# Patient Record
Sex: FEMALE | Race: ASIAN
[De-identification: names, ages, dates, MRNs, and addresses within clinical notes are randomized per-mention and may not be internally consistent; named-entity substitution may affect disease eponyms.]

---

## 2018-04-30 ENCOUNTER — HOSPITAL ENCOUNTER (INPATIENT)
Dept: HOSPITAL 88 - ER | Age: 83
LOS: 7 days | Discharge: NURSING, REHAB OR LONG-TERM CARE | DRG: 467 | End: 2018-05-07
Attending: SPECIALIST | Admitting: SPECIALIST
Payer: MEDICARE

## 2018-04-30 VITALS — BODY MASS INDEX: 23.51 KG/M2 | WEIGHT: 112 LBS | HEIGHT: 58 IN

## 2018-04-30 DIAGNOSIS — D64.9: ICD-10-CM

## 2018-04-30 DIAGNOSIS — I10: ICD-10-CM

## 2018-04-30 DIAGNOSIS — T84.52XA: Primary | ICD-10-CM

## 2018-04-30 DIAGNOSIS — B95.2: ICD-10-CM

## 2018-04-30 DIAGNOSIS — B95.62: ICD-10-CM

## 2018-04-30 DIAGNOSIS — Z96.642: ICD-10-CM

## 2018-04-30 DIAGNOSIS — N39.0: ICD-10-CM

## 2018-04-30 DIAGNOSIS — M16.12: ICD-10-CM

## 2018-04-30 LAB
ALBUMIN SERPL-MCNC: 3.1 G/DL (ref 3.5–5)
ALBUMIN/GLOB SERPL: 0.5 {RATIO} (ref 0.8–2)
ALP SERPL-CCNC: 83 IU/L (ref 40–150)
ALT SERPL-CCNC: 8 IU/L (ref 0–55)
ANION GAP SERPL CALC-SCNC: 13.8 MMOL/L (ref 8–16)
BACTERIA URNS QL MICRO: (no result) /HPF
BASOPHILS # BLD AUTO: 0 10*3/UL (ref 0–0.1)
BASOPHILS NFR BLD AUTO: 0.5 % (ref 0–1)
BILIRUB UR QL: NEGATIVE
BUN SERPL-MCNC: 23 MG/DL (ref 7–26)
BUN/CREAT SERPL: 24 (ref 6–25)
CALCIUM SERPL-MCNC: 9.7 MG/DL (ref 8.4–10.2)
CHLORIDE SERPL-SCNC: 104 MMOL/L (ref 98–107)
CK MB SERPL-MCNC: 1.2 NG/ML (ref 0–5)
CK SERPL-CCNC: 47 IU/L (ref 29–168)
CLARITY UR: CLEAR
CO2 SERPL-SCNC: 28 MMOL/L (ref 22–29)
COLOR UR: YELLOW
DEPRECATED APTT PLAS QN: 41.4 SECONDS (ref 23.8–35.5)
DEPRECATED INR PLAS: 1.16
DEPRECATED NEUTROPHILS # BLD AUTO: 3.7 10*3/UL (ref 2.1–6.9)
DEPRECATED RBC URNS MANUAL-ACNC: (no result) /HPF (ref 0–5)
EGFRCR SERPLBLD CKD-EPI 2021: 54 ML/MIN (ref 60–?)
EOSINOPHIL # BLD AUTO: 0.2 10*3/UL (ref 0–0.4)
EOSINOPHIL NFR BLD AUTO: 3.2 % (ref 0–6)
EPI CELLS URNS QL MICRO: (no result) /LPF
ERYTHROCYTE [DISTWIDTH] IN CORD BLOOD: 12.7 % (ref 11.7–14.4)
GLOBULIN PLAS-MCNC: 6.6 G/DL (ref 2.3–3.5)
GLUCOSE SERPLBLD-MCNC: 107 MG/DL (ref 74–118)
HCT VFR BLD AUTO: 32.9 % (ref 34.2–44.1)
HGB BLD-MCNC: 10.5 G/DL (ref 12–16)
KETONES UR QL STRIP.AUTO: NEGATIVE
LEUKOCYTE ESTERASE UR QL STRIP.AUTO: NEGATIVE
LYMPHOCYTES # BLD: 1.6 10*3/UL (ref 1–3.2)
LYMPHOCYTES NFR BLD AUTO: 26.7 % (ref 18–39.1)
MCH RBC QN AUTO: 28.5 PG (ref 28–32)
MCHC RBC AUTO-ENTMCNC: 31.9 G/DL (ref 31–35)
MCV RBC AUTO: 89.2 FL (ref 81–99)
MONOCYTES # BLD AUTO: 0.4 10*3/UL (ref 0.2–0.8)
MONOCYTES NFR BLD AUTO: 7 % (ref 4.4–11.3)
NEUTS SEG NFR BLD AUTO: 62.3 % (ref 38.7–80)
NITRITE UR QL STRIP.AUTO: NEGATIVE
PLATELET # BLD AUTO: 210 X10E3/UL (ref 140–360)
POTASSIUM SERPL-SCNC: 3.8 MMOL/L (ref 3.5–5.1)
PROT UR QL STRIP.AUTO: NEGATIVE
PROTHROMBIN TIME: 13.9 SECONDS (ref 11.9–14.5)
RBC # BLD AUTO: 3.69 X10E6/UL (ref 3.6–5.1)
SODIUM SERPL-SCNC: 142 MMOL/L (ref 136–145)
SP GR UR STRIP: 1.01 (ref 1.01–1.02)
UROBILINOGEN UR STRIP-MCNC: 0.2 MG/DL (ref 0.2–1)

## 2018-04-30 PROCEDURE — 86920 COMPATIBILITY TEST SPIN: CPT

## 2018-04-30 PROCEDURE — 82550 ASSAY OF CK (CPK): CPT

## 2018-04-30 PROCEDURE — 85018 HEMOGLOBIN: CPT

## 2018-04-30 PROCEDURE — 87205 SMEAR GRAM STAIN: CPT

## 2018-04-30 PROCEDURE — 99285 EMERGENCY DEPT VISIT HI MDM: CPT

## 2018-04-30 PROCEDURE — 36569 INSJ PICC 5 YR+ W/O IMAGING: CPT

## 2018-04-30 PROCEDURE — 85730 THROMBOPLASTIN TIME PARTIAL: CPT

## 2018-04-30 PROCEDURE — 93005 ELECTROCARDIOGRAM TRACING: CPT

## 2018-04-30 PROCEDURE — 80053 COMPREHEN METABOLIC PANEL: CPT

## 2018-04-30 PROCEDURE — 86900 BLOOD TYPING SEROLOGIC ABO: CPT

## 2018-04-30 PROCEDURE — 86850 RBC ANTIBODY SCREEN: CPT

## 2018-04-30 PROCEDURE — 97139 UNLISTED THERAPEUTIC PX: CPT

## 2018-04-30 PROCEDURE — 87186 SC STD MICRODIL/AGAR DIL: CPT

## 2018-04-30 PROCEDURE — 96361 HYDRATE IV INFUSION ADD-ON: CPT

## 2018-04-30 PROCEDURE — 71045 X-RAY EXAM CHEST 1 VIEW: CPT

## 2018-04-30 PROCEDURE — 84484 ASSAY OF TROPONIN QUANT: CPT

## 2018-04-30 PROCEDURE — 87086 URINE CULTURE/COLONY COUNT: CPT

## 2018-04-30 PROCEDURE — 82553 CREATINE MB FRACTION: CPT

## 2018-04-30 PROCEDURE — 82948 REAGENT STRIP/BLOOD GLUCOSE: CPT

## 2018-04-30 PROCEDURE — 87071 CULTURE AEROBIC QUANT OTHER: CPT

## 2018-04-30 PROCEDURE — 87075 CULTR BACTERIA EXCEPT BLOOD: CPT

## 2018-04-30 PROCEDURE — 80202 ASSAY OF VANCOMYCIN: CPT

## 2018-04-30 PROCEDURE — 81001 URINALYSIS AUTO W/SCOPE: CPT

## 2018-04-30 PROCEDURE — 83735 ASSAY OF MAGNESIUM: CPT

## 2018-04-30 PROCEDURE — 87040 BLOOD CULTURE FOR BACTERIA: CPT

## 2018-04-30 PROCEDURE — 72170 X-RAY EXAM OF PELVIS: CPT

## 2018-04-30 PROCEDURE — 36415 COLL VENOUS BLD VENIPUNCTURE: CPT

## 2018-04-30 PROCEDURE — 96367 TX/PROPH/DG ADDL SEQ IV INF: CPT

## 2018-04-30 PROCEDURE — 85610 PROTHROMBIN TIME: CPT

## 2018-04-30 PROCEDURE — 85014 HEMATOCRIT: CPT

## 2018-04-30 PROCEDURE — 96376 TX/PRO/DX INJ SAME DRUG ADON: CPT

## 2018-04-30 PROCEDURE — 85025 COMPLETE CBC W/AUTO DIFF WBC: CPT

## 2018-04-30 NOTE — DIAGNOSTIC IMAGING REPORT
PROCEDURE:

A single AP view of the chest.

 

COMPARISON: None.

 

INDICATIONS:   LEFT HIP INFECTION 

     

FINDINGS:

Lines/tubes:  None.

 

Lungs:  Scalloping of the right hemidiaphragm. There is no evidence of 

pneumonia or pulmonary edema. Mild biapical pleural-parenchymal 

scarring left greater than right.

 

Pleura:  There is no pleural effusion or pneumothorax.

 

Heart and mediastinum:  The cardiac silhouette is mildly enlarged. The 

thoracic aorta tortuous and unfolded with calcifications actually off 

the arch. Mild prominence of the pulmonary vasculature bilaterally may 

be related to portable technique.

 

Bones:  No acute bony abnormality.

 

IMPRESSION: 

 

1.  Cardiomegaly. Possible mild pulmonary venous congestion without 

pulmonary edema. 

 

 

HANNAH Page M.D.  

Dictated by:  HANNAH Page M.D. on 4/30/2018 at 17:56     

Electronically approved by:  HANNAH Page M.D. on 4/30/2018 at 

17:56

## 2018-04-30 NOTE — XMS REPORT
Clinical Summary

 Created on: 2018



Ginger Clinton

External Reference #: [id number]

: 1929

Sex: Female



Demographics







 Address  [address] [place]  [address]

 

 Home Phone  [phone]

 

 Preferred Language  English

 

 Marital Status  

 

 Sikhism Affiliation  Unknown

 

 Race  

 

 Ethnic Group  Non-





Author







 Author  [facility]

 

 Organization  [facility]

 

 Address  Unknown

 

 Phone  Unavailable







Support







 Name  Relationship  Address  Phone

 

 Sara Vega  ECON  Unknown  [phone]

 

 Danay Moon  [employer]  Unknown  [phone]







Care Team Providers







 Care Team Member Name  Role  Phone

 

 MELANIE Del Angel MD  PCP  [phone]







Allergies

No Known Allergies



Current Medications







      



  Prescription   Sig.   Disp.   Refills   Start   End Date   Status



      Date  

 

      



  oxybutynin (DITROPAN) 5   Take 5 mg by mouth 3       Active



  MG tablet   (three) times a day.     

 

      



  valsartan (DIOVAN) 160 MG   Take 160 mg by mouth       Active



  tablet   daily. Only take if     



   systolic is over 135     

 

      



  alendronate (FOSAMAX) 10   Take 10 mg by mouth daily       Active



  MG tablet   before breakfast. Take in     



   the morning with a full     



   glass of water on an     



   empty stomach, do NOT     



   take anything else by     



   mouth or lie down for the     



   next 30 min.     

 

      



  aspirin (ECOTRIN) 81 MG   Take 81 mg by mouth       Active



  enteric coated tablet   daily.     

 

      



  pantoprazole (PROTONIX)   Take 40 mg by mouth 2      12/15/20   Discontin



  40 MG EC tablet   (two) times a day.      17   ued

 

      



  acetaminophen (TYLENOL)   Take 325 mg by mouth as      20   Discontin



  325 MG tablet   needed for fever.      17   ued

 

      



  aspirin (ECOTRIN) 81 MG   Take 1 tablet (81 mg   30 tablet   3   20   



  enteric coated tablet   total) by mouth daily for     17   17 



   30 days.     

 

      



  clopidogrel (PLAVIX) 75   Take 1 tablet (75 mg   30 tablet   3   20   



  mg tablet   total) by mouth daily for     17   17 



   30 days.     

 

      



  cefdinir (OMNICEF) 300 MG   Take 1 capsule (300 mg   20 capsule   0   20   



  capsule   total) by mouth 2 (two)     17   17 



   times a day for 10 days.     

 

      



  OYSCO 500/D 500   TK 1 T PO BID WF    0   10/03/20   12/28/20   Discontin



  mg(1,250mg) -200 unit per      17   17   ued



  tablet      

 

      



  clopidogrel (PLAVIX) 75   Take 75 mg by mouth      20   Discontin



  mg tablet   daily.      18   ued

 

      



  HYDROcodone-acetaminophen   Take 1 tablet by mouth     20   




  (NORCO) 7.5-325 mg per   every 6 (six) hours as     18   18 



  tablet   needed for moderate pain     



   for up to 20 doses. Max     



   Daily Amount: 4 tablets     

 

      



  acetaminophen-codeine   Take 1 tablet by mouth   10 tablet   0   20   



  (TYLENOL WITH CODEINE #3)   every 8 (eight) hours as     18   18 



  300-30 mg per tablet   needed for moderate pain     



   for up to 10 doses.     







Active Problems







 



  Problem   Noted Date

 

 



  Acute cystitis with hematuria   2017







Encounters







    



  Date   Type   Specialty   Care Team   Description

 

    



  2018   Emergency   Emergency Medicine   Melina Castro,   Left 
hip pain (Primary



     PA-C   Dx)



     Mazin Last MD 

 

    



  2018   Hospital   Radiology   Romaine Jack MD   Calculus of kidney;



   Encounter     Calculus of ureter

 

    



  2018   Ancillary   Radiology   Romaine Jack MD   Calculus of kidney;



   Orders     Calculus of ureter

 

    



  2018   Hospital   General Surgery   Romaine Jack MD 



   Encounter   

 

    



  2018   Anesthesia   General Surgery   Maida Gonzalez MD 



   Event   

 

    



  2018   Procedure Pass   General Surgery  

 

    



  2018   Surgery   General Surgery   Romaine Jack MD   Extracorporeal 
Shockwave



      Lithotripsy (Eswl)



      INITIAL-RIGHT

 

    



  2017   Hospital   General Surgery   Romaine Jack MD 



   Encounter   

 

    



  2017   Procedure Pass   General Surgery  

 

    



  2017   Surgery   General Surgery   Romaine Jack MD   **Canceled**



      Extracorporeal Shockwave



      Lithotripsy (Eswl)



      INITIAL-RIGHT

 

    



  2017   Patient   Quality   Mimi Rinaldi RN 



   Outreach   

 

    



  2017   Anesthesia   General Surgery   Valencia Story MD 



   Event   

 

    



  2017   Procedure Pass   General Surgery  

 

    



  2017   Surgery   General Surgery   Romaine Jack MD   Cysto, Stent 
insertion

 

    



  2017   Procedure Pass   General Internal Medicine  

 

    



  2017   [facility]   General Internal Medicine   Mundo Gutierrez MD   
Acute cystitis with



  -   Encounter    Rakesh De MD   hematuria (Primary Dx);



  2017     Legih Rojas MD   Renal insufficiency;



      Non-ST elevated



      myocardial infarction

 

    



  2017   Transcribe   Access   Barber Montelongo   Hematuria 
syndrome



   Maye CHOE   (Primary Dx)



after 2017



Immunizations







  



  Name   Dates Previously Given   Next Due

 

  



  FLUCELVAX QUAD PF (0.5mL   2017 



  syringe)  







Family History







   



  Medical History   Relation   Name   Comments

 

   



  No Known Problems   Father  

 

   



  Stroke   Mother  









   



  Relation   Name   Status   Comments

 

   



  Father   

 

   



  Mother   







Social History







    



  Tobacco Use   Types   Packs/Day   Years Used   Date

 

    



  Never Smoker    

 

    



  Smokeless Tobacco: Never   



  Used   









   



  Alcohol Use   Drinks/Week   oz/Week   Comments

 

   



  No   









 



  Sex Assigned at Birth   Date Recorded

 

 



  Not on file 







Last Filed Vital Signs







  



  Vital Sign   Reading   Time Taken

 

  



  Blood Pressure   143/62   2018  3:15 PM CST

 

  



  Pulse   59   2018  3:15 PM CST

 

  



  Temperature   36.8   C (98.2   F)   2018 11:59 AM CST

 

  



  Respiratory Rate   14   2018  3:15 PM CST

 

  



  Oxygen Saturation   99%   2018  3:15 PM CST

 

  



  Inhaled Oxygen   -   -



  Concentration  

 

  



  Weight   49.9 kg (110 lb)   2018 11:56 AM CST

 

  



  Height   147.3 cm (4' 10")   2018 11:56 AM CST

 

  



  Body Mass Index   22.99   2018 11:56 AM CST







Plan of Treatment







   



  Health Maintenance   Due Date   Last Done   Comments

 

   



  SHINGRIX VACCINE (#1)   1979  

 

   



  ZOSTER VACCINE   1989  

 

   



  PNEUMOCOCCAL   1994  



  POLYSACCHARIDE VACCINE   



  AGE 65 AND OVER   

 

   



  PNEUMOCOCCAL-13   1994  

 

   



  INFLUENZA VACCINE   2018 







Implants







      



  Implanted   Type   Area      Device   Expiration   Model /



      Identifier   Date   Serial /



        Lot

 

      



  Stent Uretl In[place] 6fr 22cm   Surgical   Right:   [employer]    2020   [id number] /



  W/ Ziggy Gonzalez Ltxf - [id number]   Stents   Ureter,   DIVISION     /



  Implanted: Qty: 1 on 2017 by    Native      [id number]



  Romaine Jack MD      







Procedures







    



  Procedure Name   Priority   Date/Time   Associated Diagnosis   Comments

 

    



  ID AN ELECTIVE   Routine   2018  



  SUPRAGLOTTIC AIRWAY    1:02 PM CST  

 

  



   Procedure



   Note -



   Gianfranco Zuleta CRNA -



   2018



   1:02 PM



   CST



   Airway



   Date/Time:



   1/3/2018



   12:58 PM



   Performed



   by:



   GIANFRANCO ZULETA



   Authorized



   by: MAIDA GONZALEZ





   Location:



   OR



   Urgency:



   Elective



   Difficult



   Airway: No



   Resident/C



   RNA/AA:



   GIANFRANCO ZULETA



   Performed



   by:



   resident/C



   RNA/AA



   Preoxygena



   guzman with



   100% O2:



   Yes



   C-spine



   Precaution



   s



   Maintained



   Throughout



   : Yes



   Mask



   Ventilatio



   n:  Not



   attempted



   Final



   Airway



   Type:



   Supraglott



   ic airway



   Final LMA:



   Classic



   LMA Size:



   3



   Number of



   Attempts



   at



   Approach:



   1



 

    



  ID AN ELECTIVE   Routine   2017  



  SUPRAGLOTTIC AIRWAY    11:52 AM CST  

 

  



   Procedure



   Note -



   Valencia Story MD -



   2017



   11:52 AM



   CST



   Airway



   Date/Time:



   2017



   11:15 AM



   Performed



   by:



   VALENCIA STORY



   Authorized



   by:



   VALENCIA STORY





   Location:



   OR



   Urgency:



   Elective



   Difficult



   Airway: No



   Preoxygena



   guzman with



   100% O2:



   Yes



   C-spine



   Precaution



   s



   Maintained



   Throughout



   : Yes



   Mask



   Ventilatio



   n:  Easy



   mask



   Final



   Airway



   Type:



   Supraglott



   ic airway



   Final LMA:



   Classic



   LMA Size:



   3



   Number of



   Attempts



   at



   Approach:



   1



 

    



  ECHOCARDIOGRAM 2D   Routine   2017    Results for this



  COMPLETE W MMODE SPECTRAL    4:53 PM CST    procedure are in the



  COLOR DOPPLER (47327)      results section.



after 2017



Results

* XR Hip 2-3 View Left (2018  1:35 PM)





 



  Specimen   Performing Laboratory

 

 



   [facility]



   [address] [place] [address]









 Narrative

 

 



EXAMINATION:XR HIP 2-3 VIEWS LEFT



 



CLINICAL HISTORY:hip pain



 



COMPARISON:None.



 



FINDINGS: Lungs are severely and diffusely osteopenic. The right hip 
demonstrates joint space narrowing and. On the left a left total hip 
arthroplasty is in place. A long stemmed femoral component is present with a 
cerclage wire although there appears to



 be a fracture with separation of the greater trochanter cranially and lesser 
trochanter medially these findings appear unchanged from a prior CT scan from 
2017 there may be more distraction of the fracture fragments.



 



 



IMPRESSION:Postop changes on the left with a left total hip arthroplasty. 
Separation of the greater trochanter superiorly lesser trochanter medially 
stable since previous.



 



Left hip: AP and frog-lateral views: No additional findings of significance are 
noted. There is no evidence of loosening of the prosthesis. The femoral 
component appears well-seated in the acetabular component. The fragmentation of 
the greater and lesser



 trochanters are again noted.



 



IMPRESSION: Postop changes as described with stable fragmentation of the 
greater and lesser trochanters as well as a fragment along lateral shaft the 
proximal diaphyseal region. This also appears chronic



 



 



[id number]



 









 Procedure Note

 

 



 Interface, Radiology Results Incoming - 2018  1:57 PM CST



EXAMINATION:  XR HIP 2-3 VIEWS LEFT



CLINICAL HISTORY:  hip pain



COMPARISON:  None.



FINDINGS: Lungs are severely and diffusely osteopenic. The right hip 
demonstrates joint space narrowing and. On the left a left total hip 
arthroplasty is in place. A long stemmed femoral component is present with a 
cerclage wire although there appears to

 be a fracture with separation of the greater trochanter cranially and lesser 
trochanter medially these findings appear unchanged from a prior CT scan from 
2017 there may be more distraction of the fracture fragments.





IMPRESSION:  Postop changes on the left with a left total hip arthroplasty. 
Separation of the greater trochanter superiorly lesser trochanter medially 
stable since previous.



Left hip: AP and frog-lateral views: No additional findings of significance are 
noted. There is no evidence of loosening of the prosthesis. The femoral 
component appears well-seated in the acetabular component. The fragmentation of 
the greater and lesser

 trochanters are again noted.



IMPRESSION: Postop changes as described with stable fragmentation of the 
greater and lesser trochanters as well as a fragment along lateral shaft the 
proximal diaphyseal region. This also appears chronic





[id number]







* PV duplex venous lower extremity (2018 12:45 PM)





 



  Specimen   Performing Laboratory

 

 



    [facility]



   [address] [place] [address]









 Narrative

 

 



 The left lower extremity was negative for deep vein thrombosis.



 





* XR Abdomen 1 Vw (2018 10:35 AM)





 



  Specimen   Performing Laboratory

 

 



    [facility]



   [address] [place] [address]









 Narrative

 

 



EXAMINATION:XR ABDOMEN 1 VW



 



CLINICAL HISTORY:N20.0 Calculus of kidney, N20.1 Calculus of ureter



 



COMPARISON:CT abdomen 2017



 



FINDINGS:



 



The bowel gas pattern is nonspecific. There is a double-J stent in the right 
ureter, in good position.



 No pathologic masses or calcifications are identified.



 The lung bases are free of acute disease. Regional skeletal structures are 
unremarkable.



 



IMPRESSION:



No plain film evidence of urinary tract calculus.



 



[facility]-[id number]



 









 Procedure Note

 

 



 Interface, Radiology Results Incoming - 2018  2:36 PM CST



EXAMINATION:  XR ABDOMEN 1 VW



CLINICAL HISTORY:  N20.0 Calculus of kidney, N20.1 Calculus of ureter



COMPARISON:  CT abdomen 2017



FINDINGS:



The bowel gas pattern is nonspecific. There is a double-J stent in the right 
ureter, in good position.

 No pathologic masses or calcifications are identified.

 The lung bases are free of acute disease. Regional skeletal structures are 
unremarkable.



IMPRESSION:

No plain film evidence of urinary tract calculus.



[facility]-[id number]







* XR Chest 1 Vw Portable (2017  9:11 AM)



Only the most recent of 3 results within the time period is included.





 



  Specimen   Performing Laboratory

 

 



   [address] [address]









 Narrative

 

 



EXAMINATION:XR CHEST 1 VW PORTABLE



 



CLINICAL HISTORY:Congestive Heart Failure



 



COMPARISON:Most Recent



 



IMPRESSION:



 



Heart and mediastinum are stable. Mild interstitial opacities without acute 
consolidations. Bones are osteopenic. Right shoulder arthrosis. 



 



[facility]-[id number]



 









 Procedure Note

 

 



 Interface, Radiology Results Incoming - 2017  9:15 AM CST



EXAMINATION:  XR CHEST 1 VW PORTABLE



CLINICAL HISTORY:  Congestive Heart Failure



COMPARISON:  Most Recent



IMPRESSION:



Heart and mediastinum are stable. Mild interstitial opacities without acute 
consolidations. Bones are osteopenic. Right shoulder arthrosis. 



[facility]-[id number]







* Estimated GFR (2017  7:07 AM)



Only the most recent of 7 results within the time period is included.





  



  Component   Value   Ref Range

 

  



  GFR Non Af Amer   33 (A)   mL/min/1.73 m2

 

  



  GFR Af Amer   40 (A)   mL/min/1.73 m2



   Comment: 



   Chronic kidney disease: <60 mL/min/1.73m2 



   Kidney failure: <15 mL/min/1.73m2 



   The estimated GFR is calculated from the 



   IDMS-traceable Modification of Diet 



   in Renal Disease Equation. The accuracy of the 



   calculation is poor when the 



   creatinine is normal. Calculated values >90 



   mL/min/1.73m2 are not reported. 



   This equation has not been validated in children 



   (<18 years), pregnant 



   women, the elderly (>70 years), or ethnic groups 



   other than Caucasians and 



    Americans. 









 



  Specimen   Performing Laboratory

 

 



  Plasma specimen   [facility] DEPARTMENT OF PATHOLOGY AND GENOMIC MEDICINE



   [address]



   [place] [address]





* CBC with platelet and differential (2017  7:07 AM)



Only the most recent of 8 results within the time period is included.





  



  Component   Value   Ref Range

 

  



  WBC   9.4   4.2 - 11.0 k/uL

 

  



  RBC   3.30 (L)   4.04 - 5.86 m/uL

 

  



  HGB   9.8 (L)   11.5 - 15.3 g/dL

 

  



  HCT   30.0 (L)   34.0 - 45.0 %

 

  



  MCV   90.9   80.0 - 98.0 fL

 

  



  MCH   29.7   27.0 - 34.0 pg

 

  



  MCHC   32.7   31.5 - 36.5 g/dL

 

  



  RDW - SD   47.7   37.0 - 51.0 fL

 

  



  MPV   10.6 (H)   7.4 - 10.4 fL

 

  



  Platelet count   194   150 - 400 k/uL

 

  



  Nucleated RBC   0.00   /100 WBC

 

  



  Neutrophils   74.1 (H)   36.0 - 66.0 %

 

  



  Lymphocytes   18.4 (L)   24.0 - 44.0 %

 

  



  Monocytes   5.6   0.0 - 6.0 %

 

  



  Eosinophils   1.0   0.0 - 6.0 %

 

  



  Basophils   0.1   0.0 - 1.2 %

 

  



  Immature granulocytes   0.8   0.0 - 1.0 %









 



  Specimen   Performing Laboratory

 

 



  Blood   [facility] DEPARTMENT OF PATHOLOGY AND [facility] [employer] [facility]



   [address]



   [place] [address]





* Phosphorus level (2017  7:07 AM)



Only the most recent of 4 results within the time period is included.





  



  Component   Value   Ref Range

 

  



  Phosphorus   3.5   2.5 - 4.5 mg/dL









 



  Specimen   Performing Laboratory

 

 



  Plasma specimen   [facility] DEPARTMENT OF PATHOLOGY AND GENOMIC MEDICINE



   [address]



   [place] [address]





* Magnesium level (2017  7:07 AM)



Only the most recent of 4 results within the time period is included.





  



  Component   Value   Ref Range

 

  



  Magnesium   2.10   1.60 - 2.40 mg/dL









 



  Specimen   Performing Laboratory

 

 



  Plasma specimen   [facility] DEPARTMENT OF PATHOLOGY AND GENOMIC MEDICINE



   [address] [place] [address] [address]



   [place] [address]





* Basic metabolic panel (2017  7:07 AM)



Only the most recent of 5 results within the time period is included.





  



  Component   Value   Ref Range

 

  



  Sodium   143   135 - 150 mEq/L

 

  



  Potassium   3.4 (L)   3.5 - 5.0 mEq/L

 

  



  Chloride   107   100 - 109 mEq/L

 

  



  CO2   29   24 - 32 mmol/L

 

  



  Anion gap   7   7 - 15 mEq/L



   Comment: 



   Starting from  , anion gap 



   calculation 



   no longer incorporates potassium. Please note the 



   change. 

 

  



  BUN   31 (H)   7 - 18 mg/dL

 

  



  Creatinine   1.5   0.8 - 1.5 mg/dL

 

  



  Glucose   87   65 - 100 mg/dL

 

  



  Calcium   7.7 (L)   8.6 - 10.7 mg/dL









 



  Specimen   Performing Laboratory

 

 



  Plasma specimen   [facility] DEPARTMENT OF PATHOLOGY AND GENOMIC MEDICINE



   [address]



   [place] [address]





* FL Pyelogram Retrograde (2017 11:48 AM)





 



  Specimen   Performing Laboratory

 

 



    [facility]



   [address] [place] [address]









 Narrative

 

 



EXAMINATION:FL PYELOGRAM RETROGRADE



 



CLINICAL HISTORY:Right urinary tract calculus



 



COMPARISON:None.



 



TECHNIQUE:



C-arm fluoroscopy was performed with 3 fluoroscopic spot images taken of the 
central abdomen and a portion of the upper pelvis upper pelvis in the anterior 
projection in a narrow field of view in the OR.



 



A total KV of79, and mA of 2.9, and fluoroscopic time of28 seconds was 
offered to  for the procedure.



 



The DAP (dose absorbed product) is: 4045 mGy-cm^2



 



A total of5 mL of Omnipaque 300 was injected by .



 



FINDINGS:



 



The  film demonstrates an oval opaque density projected to the right of 
the L3-L4 disc interspace level and represents the proximal right ureteric 
calculus.



 



On subsequent image the density presents as a filling defect with poor flow of 
contrast proximal to this site. Distal to this filling defect isn't opacified 
right ureter were which is unremarkable.



 



The final image demonstrate satisfactory placement of right upper urinary tract 
stent.



 



IMPRESSION:



 



Radiopaque calculus in the proximal right ureter.



 



Satisfactory placement of right upper urinary tract stent.



 



 [id number]



 









 Procedure Note

 

 



 Interface, Radiology Results Incoming - 2017  3:56 PM CST



EXAMINATION:  FL PYELOGRAM RETROGRADE



CLINICAL HISTORY:  Right urinary tract calculus    



COMPARISON:  None.



TECHNIQUE:

C-arm fluoroscopy was performed with 3 fluoroscopic spot images taken of the 
central abdomen and a portion of the upper pelvis upper pelvis in the anterior 
projection in a narrow field of view in the OR.



A total KV of  79, and mA of 2.9, and fluoroscopic time of  28 seconds was 
offered to Dr. Jack for the procedure.



The DAP (dose absorbed product) is: 4045 mGy-cm^2



A total of  5 mL of Omnipaque 300 was injected by Dr. Jack.



FINDINGS:



The  film demonstrates an oval opaque density projected to the right of 
the L3-L4 disc interspace level and represents the proximal right ureteric 
calculus.



On subsequent image the density presents as a filling defect with poor flow of 
contrast proximal to this site. Distal to this filling defect isn't opacified 
right ureter were which is unremarkable.



The final image demonstrate satisfactory placement of right upper urinary tract 
stent.



IMPRESSION:



Radiopaque calculus in the proximal right ureter.



Satisfactory placement of right upper urinary tract stent.



 [id number]







* Troponin (2017  7:23 AM)



Only the most recent of 6 results within the time period is included.





  



  Component   Value   Ref Range

 

  



  Troponin   0.07   0.00 - 0.60 ng/mL



   Comment: 



   0.11 - 1.49 ng/ml                May indicate 



   increased risk of acute 



   coronary 



   syndrome. 



   >=1.5 ng/ml                            Consistent 



   with acute myocardial 



   infarction. 



   The diagnostic value of a single normal or 



   non-diagnostic 



   result is questionable.    Serial samples at 2-6 



   hour intervals 



   are required to rule out acute myocardial 



   injury. 









 



  Specimen   Performing Laboratory

 

 



  Plasma specimen   [facility] DEPARTMENT OF PATHOLOGY AND GENOMIC MEDICINE



   [address]



   [place] [address]





* Manual differential (2017  7:23 AM)



Only the most recent of 2 results within the time period is included.





  



  Component   Value   Ref Range

 

  



  Manual differential   PERFORMED 

 

  



  Neutrophils   94.0 (H)   36.0 - 66.0 %

 

  



  Lymphocytes   4.0 (L)   24.0 - 44.0 %

 

  



  Monocytes   2.0   0.0 - 6.0 %

 

  



  Eosinophils   0.0   0.0 - 6.0 %

 

  



  Basophils   0.0   0.0 - 1.2 %

 

  



  Metamyelocytes   0   0 - 1 %

 

  



  Promyelocytes   0   0 - 1 %

 

  



  Platelet slide review   Karly adequate 

 

  



  Enlarged platelets   Few 









 



  Specimen   Performing Laboratory

 

 



   [facility] DEPARTMENT OF PATHOLOGY AND GENOMIC MEDICINE



   [address]



   [place] [address]





* Partial thromboplastin time, activated (2017  7:23 AM)



Only the most recent of 2 results within the time period is included.





  



  Component   Value   Ref Range

 

  



  PTT   205.5 (HH)   23.0 - 36.0 sec



   Comment: 



   PTT therapeutic range for unfractionated heparin 



   is 



   61.0-112.0 seconds which corresponds to Anti-Xa 



   0.3-0.7 U/ml. 



   Note:    Change in Panic Value 



   The PTT Panic Value is changing from 110 sec. to 



   100 sec. 



   due to new instrumentation and reagents. 



   Correlation studies have been performed to 



   validate this result. 



   Results called to and read back by SONIA KIRK at    08:14    2017 WEB 



   REPEAT HDO=993.7 









 



  Specimen   Performing Laboratory

 

 



  Blood   [facility] DEPARTMENT OF PATHOLOGY AND GENOMIC MEDICINE



   [address] [place] [address] [address]



   [place] [address]





* Prothrombin time with INR (2017  7:23 AM)



Only the most recent of 2 results within the time period is included.





  



  Component   Value   Ref Range

 

  



  Prothrombin time   15.7 (H)   12.0 - 15.0 sec

 

  



  INR   1.23 (H)   0.92 - 1.12



   Comment: 



   For patients on anticoagulant therapy, reference 



   ranges below: 



   Indication: 



   INR Value 



   Treatment of Venous 



   Thrombosis,                     2.0-3.0 



   pulmonary emboli, or prophylaxis 



   of a venous thrombosis, or systemic 



   emboli. 



   High dose, high risk 



   patients                         3.0-4.5 



   with mechanical valves. 



   NOTE:    INR values over 3.0 are sometimes 



   associated with 



   gastrointestinal hemorrhage, especially values 



   over 4.0. 









 



  Specimen   Performing Laboratory

 

 



  Blood   [facility] DEPARTMENT OF PATHOLOGY AND GENOMIC MEDICINE



   [address] [place] [address] [address]



   [place] [address]





* Anti Xa, unfractionated (2017  7:23 AM)



Only the most recent of 7 results within the time period is included.





  



  Component   Value   Ref Range

 

  



  Anti Xa, unfractionated   0.45Comment: Therapeutic Range:  0.30 - 0.70 U/mL   
0.30 - 0.70 U/mL









 



  Specimen   Performing Laboratory

 

 



  Blood   [facility] DEPARTMENT OF PATHOLOGY AND GENOMIC MEDICINE



   [address] [place] [address] [place]



   [place] [address]





* T3, free (2017  7:23 AM)





  



  Component   Value   Ref Range

 

  



  T3, free   0.68 (L)   2.18 - 3.98 pmol/L









 



  Specimen   Performing Laboratory

 

 



  Plasma specimen   [facility] DEPARTMENT OF PATHOLOGY AND GENOMIC MEDICINE



   [address] [place] [address]





* Thyroid stimulating hormone (2017  7:23 AM)





  



  Component   Value   Ref Range

 

  



  TSH   0.29 (L)   0.38 - 4.82 uIU/mL









 



  Specimen   Performing Laboratory

 

 



  Plasma specimen   [facility] DEPARTMENT OF PATHOLOGY AND [facility] MEDICINE



   [address] [place] [address]





* B natriuretic peptide (2017  7:23 AM)



Only the most recent of 2 results within the time period is included.





  



  Component   Value   Ref Range

 

  



  BNP   1,360 (H)   0 - 100 pg/mL









 



  Specimen   Performing Laboratory

 

 



  Blood   [facility] DEPARTMENT OF PATHOLOGY AND [facility] MEDICINE



   [address] [place] [address]





* Hemoglobin A1c (2017  7:23 AM)





  



  Component   Value   Ref Range

 

  



  Hemoglobin A1C   6.2 (H)   4.0 - 6.0 %



   Comment: 



   ************************************************** 



   *** 



   Less than 6% -                 Goal of therapy for 



   Type II Diabetes 



   Less than 7%-                    Goal of therapy 



   for Type I Diabetes 



   Less than 8%-                    Acceptable 



   control for Type I or Type 



   II 



   Diabetes 



   Greater than 8%-                Unacceptable 



   control; action indicated. 



   (A 



   DA94) 









 



  Specimen   Performing Laboratory

 

 



  Blood   [facility] DEPARTMENT OF PATHOLOGY AND [facility] MEDICINE



   [address] [place] [address]





* Ionized calcium (2017  7:23 AM)



Only the most recent of 2 results within the time period is included.





  



  Component   Value   Ref Range

 

  



  pH   7.37 

 

  



  Ionized calcium   1.06 (L)   1.11 - 1.32 mmol/L









 



  Specimen   Performing Laboratory

 

 



  Plasma specimen   [facility] DEPARTMENT OF PATHOLOGY AND GENOMIC MEDICINE



   [address] [place] [address]





* Lipid panel (2017  7:23 AM)





  



  Component   Value   Ref Range

 

  



  Cholesterol   113 (L)   120 - 200 mg/dL

 

  



  Triglycerides   131   50 - 150 mg/dL

 

  



  HDL cholesterol   16 (L)   40 - 60 mg/dL

 

  



  LDL cholesterol   82Comment: Result obtained by direct LDL   mg/dL



   measurement 

 

  



  Lipid panel   See below 



  interpretation   Comment: 



   Total Cholesterol (mg/dL) 



   LDL Cholesterol (mg/dL) 



   <200 



   Desirable                                <100 



   Optimal 



   200-239            Borderline-high 



   100-129            Near or above optimal 



   >=240                High 



   130-159            Borderline-high 



   160-189            High 



   >=190                Very high 



   HDL Cholesterol 



   (mg/dL)                             Triglycerides 



   (mg/dL) 



   <40                    Low 



   <150                 Normal 



   >=60 



   High 



   150-199            Borderline-high 



   200-499            High 



   >=500                Very high 



   Risk Catergories that modify LDL goals. 



   Risk 



   Catergories 



   LDL goal (mg/dL) 



   CHD and CHD risk 



   equivalent                        <100 



   (10-year risk >20%) 



   Multiple (2+) risk factors 



   <130 



   (10-year risk=<20%) 



   0-1 risk 



   factors 



   <160 



   (<10-year 



   risk) 



   Defining levels of lipids in metabolic syndrome 



   Triglycerides 



   >=150 mg/dL 



   HDL 



   Cholesterol 



   Men 



   <40 mg/dL 



   Women 



   <50 mg/dL 



   Non-HDL cholesterol is a second target for therapy 



   in persons 



   with high triglycerides (>=200 



   mg/dL) 









 



  Specimen   Performing Laboratory

 

 



  Plasma specimen   [facility] DEPARTMENT OF PATHOLOGY AND GENOMIC MEDICINE



   [address]



   [place] [address]





* Comprehensive metabolic panel (2017  7:23 AM)



Only the most recent of 2 results within the time period is included.





  



  Component   Value   Ref Range

 

  



  Sodium   141   135 - 150 mEq/L

 

  



  Potassium   4.2   3.5 - 5.0 mEq/L

 

  



  Chloride   110 (H)   100 - 109 mEq/L

 

  



  CO2   20 (L)   24 - 32 mmol/L

 

  



  Anion gap   11   7 - 15 mEq/L



   Comment: 



   Starting from  , anion gap 



   calculation 



   no longer incorporates potassium. Please note the 



   change. 

 

  



  BUN   28 (H)   7 - 18 mg/dL

 

  



  Creatinine   1.5   0.8 - 1.5 mg/dL

 

  



  Glucose   129 (H)   65 - 100 mg/dL

 

  



  Calcium   7.4 (L)   8.6 - 10.7 mg/dL

 

  



  Protein   6.9   6.3 - 8.2 g/dL

 

  



  Albumin   2.0 (L)   3.2 - 5.0 g/dL

 

  



  A/G ratio   0.4 (L)   0.7 - 3.8

 

  



  Alkaline phosphatase   135 (H)   30 - 120 U/L

 

  



  AST   19   15 - 37 U/L

 

  



  ALT   25 (L)   30 - 65 U/L

 

  



  Total bilirubin   0.6   0.2 - 1.2 mg/dL









 



  Specimen   Performing Laboratory

 

 



  Plasma specimen   [facility] DEPARTMENT OF PATHOLOGY AND GENOMIC MEDICINE



   [address]



   [place] [address]





* Lactic acid level, SEPSIS - Now and repeat 2x every 3 hours (2017  1:18 
PM)



Only the most recent of 2 results within the time period is included.





  



  Component   Value   Ref Range

 

  



  Lactic acid   1.5   0.5 - 2.2 mmol/L









 



  Specimen   Performing Laboratory

 

 



  Blood   [facility] DEPARTMENT OF PATHOLOGY AND GENOMIC MEDICINE



   [address]



   [place] [address]





* Occult blood, stool (2017 10:58 AM)





  



  Component   Value   Ref Range

 

  



  Occult blood, stool   Negative for occult blood. 



   Comment: 



   Specimen Information 



   Specimen Source: Stool 



   Specimen Site: Not otherwise specified 









 



  Specimen   Performing Laboratory

 

 



  Stool - Not otherwise   [facility] DEPARTMENT OF PATHOLOGY AND GENOMIC MEDICINE



  specified   [address]



   [place] [address]





* Lactic acid level (2017  5:50 AM)



Only the most recent of 3 results within the time period is included.





  



  Component   Value   Ref Range

 

  



  Lactic acid   1.2   0.5 - 2.2 mmol/L









 



  Specimen   Performing Laboratory

 

 



  Blood   [facility] DEPARTMENT OF PATHOLOGY AND GENOMIC MEDICINE



   [address]



   [place] [address]





* Blood culture, aerobic & anaerobic (2017  1:00 AM)



Only the most recent of 4 results within the time period is included.





  



  Component   Value   Ref Range

 

  



  Blood culture isolate   No growth after 5 days of incubation. 



   Comment: 



   Specimen Information 



   Specimen Source: Blood 



   Specimen Site: Left Hand 









 



  Specimen   Performing Laboratory

 

 



  Blood   [facility] DEPARTMENT OF PATHOLOGY AND GENOMIC MEDICINE



   [address] [place] [address]





* Influenza antigen (2017 12:52 AM)



Only the most recent of 2 results within the time period is included.





  



  Component   Value   Ref Range

 

  



  Influenza antigen   Negative for Influenza A/B antigen. 



   Comment: 



   Specimen Information 



   Specimen Source: Nares 



   Specimen Site: Left 









 



  Specimen   Performing Laboratory

 

 



  Nares - Left   [facility] DEPARTMENT OF PATHOLOGY AND GENOMIC MEDICINE



   [address]



   [place] [address]





* Urinalysis screen and microscopy, with reflex to culture (2017 10:05 PM)



Only the most recent of 2 results within the time period is included.





  



  Component   Value   Ref Range

 

  



  Specimen site   Clean catch 

 

  



  Color, UA   Yellow 

 

  



  Appearance, UA   Slightly-Cloudy 

 

  



  Specific gravity, UA   1.004   1.001 - 1.035

 

  



  pH, UA   7.0   5.0 - 8.5

 

  



  Protein, UA   Negative   Negative

 

  



  Glucose, UA   Negative   Negative

 

  



  Ketones, UA   Negative   Negative

 

  



  Bilirubin, UA   Negative   Negative

 

  



  Blood, UA   Small (A)   Negative

 

  



  Nitrite, UA   Negative   Negative

 

  



  Urobilinogen, UA   Negative   <2.0

 

  



  Leukocyte esterase, UA   Large (A)   Negative

 

  



  Epithelial cells, UA   Few   /HPF

 

  



  WBC, UA   75 (H)   0 - 5 /HPF

 

  



  RBC, UA   20 (A)   0 - 5 /HPF

 

  



  Bacteria, UA   Trace   None seen

 

  



  Yeast, UA   None seen 

 

  



  Yeast with pseudohyphae,   None seen 



  UA  









 



  Specimen   Performing Laboratory

 

 



  Urine   [facility] DEPARTMENT OF PATHOLOGY AND GENOMIC MEDICINE



   [address] [place] [address] [address]



   [place] [address]





* Sodium level, urine, random (2017 10:05 PM)





  



  Component   Value   Ref Range

 

  



  Sodium, urine, random   57   mEQ/L









 



  Specimen   Performing Laboratory

 

 



  Urine   [facility] DEPARTMENT OF PATHOLOGY AND GENOMIC MEDICINE



   [address] [place] [address]





* Gram stain (2017 10:05 PM)



Only the most recent of 2 results within the time period is included.





  



  Component   Value   Ref Range

 

  



  Gram stain result   Occasional WBC's 



   No organisms seen 



   Comment: 



   Specimen Information 



   Specimen Source: Urine 



   Specimen Site: See UA 









 



  Specimen   Performing Laboratory

 

 



  Urine   [facility] DEPARTMENT OF PATHOLOGY AND GENOMIC MEDICINE



   [address] [place] [address]





* Urine culture (2017 10:05 PM)



Only the most recent of 2 results within the time period is included.





  



  Component   Value   Ref Range

 

  



  Urine culture isolate   No growth after 2 days. 



   Comment: 



   Specimen Information 



   Specimen Source: Urine 



   Specimen Site: See UA 









 



  Specimen   Performing Laboratory

 

 



  Urine   [facility] DEPARTMENT OF PATHOLOGY AND GENOMIC MEDICINE



   [address] [place] [address]





* Vitamin D 25 hydroxy level (2017  6:01 AM)





  



  Component   Value   Ref Range

 

  



  Vitamin D, 25-hydroxy   14.7 (L)   30.0 - 150.0 ng/mL



   Comment: 



   This assay reports the sum of 25-hydroxy vitamin 



   D3 and 25-hydroxy vitamin 



   D2. 



   Reference range: 



   0-17 years: 



   Deficiency: less than 20ng/mL 



   Optimum level: greater than or equal to 20 ng/mL. 



   18 years and older: 



   Deficiency: less than 20ng/mL 



   Insufficiency: 20-29 ng/mL 



   Optimum Level: 30-80 ng/mL 



   The assay reportable range is 3.4    155.9 ng/mL. 



   Levels higher than 150 



   ng/mL may be associated with toxicity. 



   If toxicity is clinically suspected and the 



   reported result is >155.9 



   ng/mL,contact lab for alternative methods to 



   obtain a definitive    level. 



   If separate quantitation of 25-hydroxy vitamin D3 



   and 25-hydroxy vitamin D2 



   is needed, please contact lab for alternative 



   methods. 









 



  Specimen   Performing Laboratory

 

 



  Blood   [facility] DEPARTMENT OF PATHOLOGY AND GENOMIC MEDICINE



   [address] [place] [address]





* Parathyroid hormone (2017  6:01 AM)





  



  Component   Value   Ref Range

 

  



  PTH   107 (H)   15 - 65 pg/mL









 



  Specimen   Performing Laboratory

 

 



  Blood   [facility] DEPARTMENT OF PATHOLOGY AND GENOMIC MEDICINE



   [address] [place] [address]





* Hepatic function panel (2017  6:01 AM)





  



  Component   Value   Ref Range

 

  



  Albumin   1.9 (L)   3.2 - 5.0 g/dL

 

  



  Total bilirubin   0.7   0.2 - 1.2 mg/dL

 

  



  Bilirubin direct   0.3   0.0 - 0.4 mg/dL

 

  



  Alkaline phosphatase   148 (H)   30 - 120 U/L

 

  



  Protein   6.5   6.3 - 8.2 g/dL

 

  



  ALT   27 (L)   30 - 65 U/L

 

  



  AST   32   15 - 37 U/L









 



  Specimen   Performing Laboratory

 

 



  Plasma specimen   [facility] DEPARTMENT OF PATHOLOGY AND GENOMIC MEDICINE



   [address]



   [place] [address]





* Creatinine level, urine, random (2017  1:06 AM)





  



  Component   Value   Ref Range

 

  



  Creatinine, urine, random   35   mg/dL









 



  Specimen   Performing Laboratory

 

 



  Urine   [facility] DEPARTMENT OF PATHOLOGY AND GENOMIC MEDICINE



   [address]



   [place] [address]





* CBC hemogram (2017 10:48 PM)





  



  Component   Value   Ref Range

 

  



  WBC   10.5   4.2 - 11.0 k/uL

 

  



  RBC   3.09 (L)   4.04 - 5.86 m/uL

 

  



  HGB   9.3 (L)   11.5 - 15.3 g/dL

 

  



  HCT   27.0 (L)   34.0 - 45.0 %

 

  



  MCV   87.4   80.0 - 98.0 fL

 

  



  MCH   30.1   27.0 - 34.0 pg

 

  



  MCHC   34.4   31.5 - 36.5 g/dL

 

  



  RDW - SD   43.3   37.0 - 51.0 fL

 

  



  MPV   11.0 (H)   7.4 - 10.4 fL

 

  



  Platelet count   138 (L)   150 - 400 k/uL

 

  



  Nucleated RBC   0.00   /100 WBC









 



  Specimen   Performing Laboratory

 

 



  Blood   [facility] DEPARTMENT OF PATHOLOGY AND GENOMIC MEDICINE



   [address]



   [place] [address]





* US Renal (2017 10:07 PM)





 



  Specimen   Performing Laboratory

 

 



    [facility]



   [address] [place] [address]









 Narrative

 

 



EXAMINATION:US RENAL



 



CLINICAL HISTORY:Elevated abnormal renal function tests



 



COMPARISON:CT abdomen and pelvis dated 2017



 



FINDINGS: 



 



The right kidney measures 11.4 x 5.3 x 6.5 cm. There is a 16 mm shadowing stone 
identified in the right kidney parenchyma. There is moderate right-sided 
hydronephrosis. This is also noted on prior CT where there is a right UPJ 
stone. There is no mass or 



cyst.



 



Left kidney measures 9.4 x 5.1 x 4.5 cm. There is no mass, cyst or stone. There 
is no hydronephrosis.



 



The bladder is only minimally filled. Bilateral flow jets are noted.



 



 



 



IMPRESSION:



 



Renal ultrasound confirms a large 16 mm stone in the right kidney parenchymal 
as well as moderate right-sided hydronephrosis as noted on prior CT.



 



 



[facility]-[id number]



 









 Procedure Note

 

 



 Interface, Radiology Results Incoming - 2017 10:15 PM CST



EXAMINATION:  US RENAL



CLINICAL HISTORY:  Elevated abnormal renal function tests



COMPARISON:  CT abdomen and pelvis dated 2017



FINDINGS: 



The right kidney measures 11.4 x 5.3 x 6.5 cm. There is a 16 mm shadowing stone 
identified in the right kidney parenchyma. There is moderate right-sided 
hydronephrosis. This is also noted on prior CT where there is a right UPJ 
stone. There is no mass or 

cyst.



Left kidney measures 9.4 x 5.1 x 4.5 cm. There is no mass, cyst or stone. There 
is no hydronephrosis.



The bladder is only minimally filled. Bilateral flow jets are noted.







IMPRESSION:



Renal ultrasound confirms a large 16 mm stone in the right kidney parenchymal 
as well as moderate right-sided hydronephrosis as noted on prior CT.





[facility]-[id number]







* ECG ED Preliminary Interpretation - NOT AN ORDER (2017  6:00 PM)





 Unique De MD 20176:00 PM



ECG ED Preliminary Interpretation - Not an Order



Performed by: RAKESH DE



Authorized by: RAKESH DE 



 



Rate: 



ECG rate:60



ECG rate assessment: normal



Rhythm: 



Rhythm: sinus rhythm



Ectopy: 



Ectopy: none



QRS: 



QRS axis:Normal



ST segments: 



ST segments:Normal



T waves: 



T waves: normal





* Echocardiogram complete w contrast and 3D if needed (2017  4:53 PM)





  



  Component   Value   Ref Range

 

  



  Velocity Ratio (V1/V2)   0.63   m/s

 

  



  AoV Mean PG   7.33   mmHg

 

  



  AV LVOT peak gradient   5.65   mmHg

 

  



  MV mean gradient   2.32   mmHg

 

  



  MV valve area p 1/2   3.39   cm2



  method  

 

  



  PV Pk Grad   3.92   mmHg

 

  



  E/A ratio   0.72 

 

  



  E wave decelartion time   207.59   msec

 

  



  LVOT Diam,S   1.83   cm

 

  



  LVOT area   2.63   cm2

 

  



  LVOT Vmax   1.19   m/s

 

  



  LVOT VTI   0.25   m

 

  



  AoV Peak PG   14.19   mmHg

 

  



  MV Peak E Braeden   0.93   m/s

 

  



  MV stenosis pressure 1/2   64.85   ms



  time  

 

  



  MV Peak A Braeden   1.30   m/s

 

  



  AoV Area, Vmax   1.81   cm2

 

  



  AoV Area, VTI   2.01   cm2

 

  



  AoV Vmax   1.88   m/s

 

  



  Left Atrium Dimension   2.85   cm



  Anterior  

 

  



  PV VMAX   0.99   m/s

 

  



  RVSP (TR)   40.83   mmHg

 

  



  TR Vpeak   2.78   mm/s

 

  



  MV E A ratio   0.82   mmHg

 

  



  TR pk grad   30.83   mmHg

 

  



  MR peak grad   7.58   mmHg

 

  



  RVSP   40.83   mmHg

 

  



  Ao Root Diameter   3.20   cm

 

  



  AR Press Half Time   258.57   ms

 

  



  LVOT CO   4.54   l/min

 

  



  LVOT HR for LVOT CO   69.14   bpm

 

  



  MV Vmax   1.38   m

 

  



  MV VTI Tips   0.29   m

 

  



  AoV Vmn   1.27 

 

  



  AR slope   3.56 

 

  



  Ar Vmax   3.17 

 

  



  Ao Root Diameter   3.20   cm

 

  



  AoV VTI   0.39   m

 

  



  MV AE ratio   1.30 

 

  



  LVOT Vmn   0.85 

 

  



  Aov area Vmn   1.82   cm2

 

  



  LVOT mean grad   3.27   mmHg

 

  



  MAX Pred HR   131.75 

 

  



  85 of MPHR   111.99 

 

  



  AR DT   891.64   msec

 

  



  AR pk grad   40.28   mmHg

 

  



  Calc MPHR   131.75   bpm

 

  



  MV Decel slope   4.49   m/s2

 

  



  Pred Exer Dur R1   4.85 

 

  



  Pred METS R1   3.23 









 



  Specimen   Performing Laboratory

 

 



    [facility]



   [address] [place] [address]









 Narrative

 

 



 There is mild left ventricular concentric hypertrophy.



 Left Ventricular ejection fraction is 50 - 55%.



 Right ventricular size is normal.



 Left atrium size is mildly dilated.



 Trace mitral valve regurgitation



 The mitral valve appears thickened.



 No pericardial effusion



 





* MRI Lumbar Spine Wo Contrast (2017 12:59 PM)





 



  Specimen   Performing Laboratory

 

 



   [place] [address]









 Narrative

 

 



EXAMINATION:MRI LUMBAR SPINE WO CONTRAST



 



COMPARISON:None



 



CLINICAL HISTORY:incontinence



 



FINDINGS: Other than minor degenerative changes, the discs are unremarkable 
without significant bulge or protrusion. There is a very tiny anterior 
listhesis of L4 on L5 which in combination with facet hypertrophy and 
ligamentum flavum thickening results 



in relative narrowing of the spinal canal, but no true stenosis. The neural 
foramina are patent. The conus medullaris is normal. The bone marrow is 
unremarkable. There is L4-5 and L5-S1 degenerative facet change.



 



IMPRESSION: Unremarkable for age.



 



[facility]-[id number]



 









 Procedure Note

 

 



 Interface, Radiology Results Incoming - 2017  1:13 PM CST



EXAMINATION:  MRI LUMBAR SPINE WO CONTRAST



COMPARISON:  None



CLINICAL HISTORY:  incontinence



FINDINGS: Other than minor degenerative changes, the discs are unremarkable 
without significant bulge or protrusion. There is a very tiny anterior 
listhesis of L4 on L5 which in combination with facet hypertrophy and 
ligamentum flavum thickening results 

in relative narrowing of the spinal canal, but no true stenosis. The neural 
foramina are patent. The conus medullaris is normal. The bone marrow is 
unremarkable. There is L4-5 and L5-S1 degenerative facet change.



IMPRESSION: Unremarkable for age.



[facility]-[id number]







* CT Head Wo Contrast (2017 12:46 PM)





 



  Specimen   Performing Laboratory

 

 



   [place] [address]









 Narrative

 

 



EXAMINATION:CT HEAD WO CONTRAST



 



COMPARISON:None



 



CLINICAL HISTORY:incontinence



 



TECHNIQUE: Up to date CT equipment and radiation dose reduction technique were 
utilized.



 



FINDINGS: There are small vessel ischemic changes of the white matter. There 
are no other areas of abnormal density. There is no mass or extra-axial fluid 
collection. The ventricles and sulci are prominent compatible with age-related 
volume loss. There 



are calcifications in the internal carotid arteries. The sinuses and mastoids 
are clear.



 



IMPRESSION: Chronic age-related changes.



 



[facility]-[id number]



 









 Procedure Note

 

 



 Interface, Radiology Results Incoming - 2017 12:56 PM CST



EXAMINATION:  CT HEAD WO CONTRAST



COMPARISON:  None



CLINICAL HISTORY:  incontinence



TECHNIQUE: Up to date CT equipment and radiation dose reduction technique were 
utilized.



FINDINGS: There are small vessel ischemic changes of the white matter. There 
are no other areas of abnormal density. There is no mass or extra-axial fluid 
collection. The ventricles and sulci are prominent compatible with age-related 
volume loss. There 

are calcifications in the internal carotid arteries. The sinuses and mastoids 
are clear.



IMPRESSION: Chronic age-related changes.



[facility]-[id number]







* CT Abdomen Pelvis Wo Contrast (2017 12:46 PM)





 



  Specimen   Performing Laboratory

 

 



    [facility]



   [address] [place] [address]









 Narrative

 

 



EXAMINATION:CT ABDOMEN PELVIS WO CONTRAST



 



CLINICAL HISTORY:urine infection please do without ORAL or IV contrast



 



TECHNIQUE: Multiple axial images of the abdomen and pelvis were obtained 
without intravenous administration of iodinated contrast. Sagittal and coronal 
computerized reformatted images were also obtained. The lack of intravenous 
contrast reduces the 



sensitivity of detecting solid organ disease.



 



CT imaging was performed with iterative reconstruction technique and/or 
automated exposure control to reduce radiation dose.



 



COMPARISON:None.



 



IMPRESSION: 



1.Mild dependent atelectasis in the visualized lung bases. Top normal heart 
size. Coronary calcifications.



2.Normal liver, adrenals, spleen, and pancreas. The gallbladder is 
surgically absent.



3.1.5 cm elongated calcification in the right kidney. 7.9 mm stone in the 
proximal right ureter. Mild right hydronephrosis. Normal left kidney.



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 
the deep pelvis. The bladder is moderately distended but otherwise 
unremarkable. Uterus within normal limits. No free fluid.



5.Small hiatal hernia. Unremarkable small bowel. Unremarkable colon.



6.Generalized bone demineralization and degenerative changes.



7.Atherosclerotic calcifications of the abdominal aorta and its branches.



 



SUMMARY:



1.7.9 mm stone in the proximal right ureter with mild right hydronephrosis. 
Additional elongated calcification within the right kidney, possible additional 
stone. Query whether this could represent calcification around retained 
catheter fragment and 



recommend correlation for any prior history of nephrostomy catheters or 
ureteral stent.



2.Other findings as above.



 



[facility]-[id number]



 









 Procedure Note

 

 



 Interface, Radiology Results Incoming - 2017 12:56 PM CST



EXAMINATION:  CT ABDOMEN PELVIS WO CONTRAST



CLINICAL HISTORY:  urine infection please do without ORAL or IV contrast



TECHNIQUE: Multiple axial images of the abdomen and pelvis were obtained 
without intravenous administration of iodinated contrast. Sagittal and coronal 
computerized reformatted images were also obtained. The lack of intravenous 
contrast reduces the 

sensitivity of detecting solid organ disease.



CT imaging was performed with iterative reconstruction technique and/or 
automated exposure control to reduce radiation dose.



COMPARISON:  None.



IMPRESSION: 

 1.  Mild dependent atelectasis in the visualized lung bases. Top normal heart 
size. Coronary calcifications.

 2.  Normal liver, adrenals, spleen, and pancreas. The gallbladder is 
surgically absent.

 3.  1.5 cm elongated calcification in the right kidney. 7.9 mm stone in the 
proximal right ureter. Mild right hydronephrosis. Normal left kidney.

 4.  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 
the deep pelvis. The bladder is moderately distended but otherwise 
unremarkable. Uterus within normal limits. No free fluid.

 5.  Small hiatal hernia. Unremarkable small bowel. Unremarkable colon.

 6.  Generalized bone demineralization and degenerative changes.

 7.  Atherosclerotic calcifications of the abdominal aorta and its branches.



SUMMARY:

 1.  7.9 mm stone in the proximal right ureter with mild right hydronephrosis. 
Additional elongated calcification within the right kidney, possible additional 
stone. Query whether this could represent calcification around retained 
catheter fragment and 

recommend correlation for any prior history of nephrostomy catheters or 
ureteral stent.

 2.  Other findings as above.



[facility]-[id number]







* ECG 12 lead (2017  9:50 PM)





  



  Component   Value   Ref Range

 

  



  Ventricular rate   60 

 

  



  Atrial rate   60 

 

  



  ID interval   178 

 

  



  QRSD interval   80 

 

  



  QT interval   438 

 

  



  QTC interval   438 

 

  



  P axis 1   18 

 

  



  QRS axis 1   60 

 

  



  T wave axis   55 

 

  



  EKG impression   Normal sinus rhythm-Normal ECG-No previous ECGs 



   available-Electronically Signed By Vanessa Wiggins MD ([id number]) on 2017 7:11:12 AM 









 



  Specimen   Performing Laboratory

 

 



   [facility]



   [address] [place] [place] [address]





* Creatine kinase, total (CPK) (2017  9:25 PM)





  



  Component   Value   Ref Range

 

  



  Creatine kinase   458 (H)   61 - 224 U/L









 



  Specimen   Performing Laboratory

 

 



  Plasma specimen   [facility] DEPARTMENT OF PATHOLOGY AND GENOMIC MEDICINE



   [facility] [address]



   [place] [address]





after 2017



Insurance







     



  Payer   Benefit   Subscriber ID   Type   Phone   Address



   Plan /    



   Group    

 

     



  HU[employer] MEDICARE   HUMANA   xxxxxxxxx   PPO  



   MEDICARE    



   PPO/PFFS/E    



   [facility]    

 

     



  AMERIGROUP   AMERIGROUP   xxxxxxxxx   O  



   STAR+PLUS    



   [facility]    









     



  Guarantor Name   Account   Relation to   Date of   Phone   Billing Address



   Type   Patient   Birth  

 

     



  MELINASAMUELGINGER   Personal/F   Self   1929   Home:   [address]     [phone]   [place] [address]

## 2018-04-30 NOTE — XMS REPORT
Clinical Summary

 Created on: 2018



Ginger Clinton

External Reference #: [id number]

: 1929

Sex: Female



Demographics







 Address  [address] [place]  [address]

 

 Home Phone  [phone]

 

 Preferred Language  English

 

 Marital Status  

 

 Christian Affiliation  Unknown

 

 Race  

 

 Ethnic Group  Non-





Author







 Author  [facility]

 

 Organization  [facility]

 

 Address  Unknown

 

 Phone  Unavailable







Support







 Name  Relationship  Address  Phone

 

 Sara Vega  [employer]  Unknown  [phone]

 

 Danay Moon  [employer]  Unknown  [phone]







Care Team Providers







 Care Team Member Name  Role  Phone

 

 MELANIE Del Angel MD  PCP  [phone]







Allergies

No Known Allergies



Current Medications







      



  Prescription   Sig.   Disp.   Refills   Start   End Date   Status



      Date  

 

      



  oxybutynin (DITROPAN) 5   Take 5 mg by mouth 3       Active



  MG tablet   (three) times a day.     

 

      



  valsartan (DIOVAN) 160 MG   Take 160 mg by mouth       Active



  tablet   daily. Only take if     



   systolic is over 135     

 

      



  alendronate (FOSAMAX) 10   Take 10 mg by mouth daily       Active



  MG tablet   before breakfast. Take in     



   the morning with a full     



   glass of water on an     



   empty stomach, do NOT     



   take anything else by     



   mouth or lie down for the     



   next 30 min.     

 

      



  aspirin (ECOTRIN) 81 MG   Take 81 mg by mouth       Active



  enteric coated tablet   daily.     

 

      



  pantoprazole (PROTONIX)   Take 40 mg by mouth 2      12/15/20   Discontin



  40 MG EC tablet   (two) times a day.      17   ued

 

      



  acetaminophen (TYLENOL)   Take 325 mg by mouth as      20   Discontin



  325 MG tablet   needed for fever.      17   ued

 

      



  aspirin (ECOTRIN) 81 MG   Take 1 tablet (81 mg   30 tablet   3   20   



  enteric coated tablet   total) by mouth daily for     17   17 



   30 days.     

 

      



  clopidogrel (PLAVIX) 75   Take 1 tablet (75 mg   30 tablet   3   20   



  mg tablet   total) by mouth daily for     17   17 



   30 days.     

 

      



  cefdinir (OMNICEF) 300 MG   Take 1 capsule (300 mg   20 capsule   0   20   



  capsule   total) by mouth 2 (two)     17   17 



   times a day for 10 days.     

 

      



  OYSCO 500/D 500   TK 1 T PO BID WF    0   10/03/20   12/28/20   Discontin



  mg(1,250mg) -200 unit per      17   17   ued



  tablet      

 

      



  clopidogrel (PLAVIX) 75   Take 75 mg by mouth      20   Discontin



  mg tablet   daily.      18   ued

 

      



  HYDROcodone-acetaminophen   Take 1 tablet by mouth     20   




  (NORCO) 7.5-325 mg per   every 6 (six) hours as     18   18 



  tablet   needed for moderate pain     



   for up to 20 doses. Max     



   Daily Amount: 4 tablets     

 

      



  acetaminophen-codeine   Take 1 tablet by mouth   10 tablet   0   20   



  (TYLENOL WITH CODEINE #3)   every 8 (eight) hours as     18   18 



  300-30 mg per tablet   needed for moderate pain     



   for up to 10 doses.     







Active Problems







 



  Problem   Noted Date

 

 



  Acute cystitis with hematuria   2017







Encounters







    



  Date   Type   Specialty   Care Team   Description

 

    



  2018   Emergency   Emergency Medicine   Melina Castro,   Left 
hip pain (Primary



     PA-C   Dx)



     Mazin Last MD 

 

    



  2018   Hospital   Radiology   Romaine Jack MD   Calculus of kidney;



   Encounter     Calculus of ureter

 

    



  2018   Ancillary   Radiology   Romaine Jack MD   Calculus of kidney;



   Orders     Calculus of ureter

 

    



  2018   Hospital   General Surgery   Romaine Jack MD 



   Encounter   

 

    



  2018   Anesthesia   General Surgery   Maida Gonzalez MD 



   Event   

 

    



  2018   Procedure Pass   General Surgery  

 

    



  2018   Surgery   General Surgery   Romaine Jack MD   Extracorporeal 
Shockwave



      Lithotripsy (Eswl)



      INITIAL-RIGHT

 

    



  2017   Hospital   General Surgery   Romaine Jack MD 



   Encounter   

 

    



  2017   Procedure Pass   General Surgery  

 

    



  2017   Surgery   General Surgery   Romaine Jack MD   **Canceled**



      Extracorporeal Shockwave



      Lithotripsy (Eswl)



      INITIAL-RIGHT

 

    



  2017   Patient   Quality   Mimi Rinaldi RN 



   Outreach   

 

    



  2017   Anesthesia   General Surgery   Valencia Story MD 



   Event   

 

    



  2017   Procedure Pass   General Surgery  

 

    



  2017   Surgery   General Surgery   Romaine Jack MD   Cysto, Stent 
insertion

 

    



  2017   Procedure Pass   General Internal Medicine  

 

    



  2017   [facility]   General Internal Medicine   Mundo Gutierrez MD   
Acute cystitis with



  -   Encounter    Rakesh De MD   hematuria (Primary Dx);



  2017     Leigh Rojas MD   Renal insufficiency;



      Non-ST elevated



      myocardial infarction

 

    



  2017   Transcribe   Access   Barber Montelongo   Hematuria 
syndrome



   Maye CHOE   (Primary Dx)



after 2017



Immunizations







  



  Name   Dates Previously Given   Next Due

 

  



  FLUCELVAX QUAD PF (0.5mL   2017 



  syringe)  







Family History







   



  Medical History   Relation   Name   Comments

 

   



  No Known Problems   Father  

 

   



  Stroke   Mother  









   



  Relation   Name   Status   Comments

 

   



  Father   

 

   



  Mother   







Social History







    



  Tobacco Use   Types   Packs/Day   Years Used   Date

 

    



  Never Smoker    

 

    



  Smokeless Tobacco: Never   



  Used   









   



  Alcohol Use   Drinks/Week   oz/Week   Comments

 

   



  No   









 



  Sex Assigned at Birth   Date Recorded

 

 



  Not on file 







Last Filed Vital Signs







  



  Vital Sign   Reading   Time Taken

 

  



  Blood Pressure   143/62   2018  3:15 PM CST

 

  



  Pulse   59   2018  3:15 PM CST

 

  



  Temperature   36.8   C (98.2   F)   2018 11:59 AM CST

 

  



  Respiratory Rate   14   2018  3:15 PM CST

 

  



  Oxygen Saturation   99%   2018  3:15 PM CST

 

  



  Inhaled Oxygen   -   -



  Concentration  

 

  



  Weight   49.9 kg (110 lb)   2018 11:56 AM CST

 

  



  Height   147.3 cm (4' 10")   2018 11:56 AM CST

 

  



  Body Mass Index   22.99   2018 11:56 AM CST







Plan of Treatment







   



  Health Maintenance   Due Date   Last Done   Comments

 

   



  SHINGRIX VACCINE (#1)   1979  

 

   



  ZOSTER VACCINE   1989  

 

   



  PNEUMOCOCCAL   1994  



  POLYSACCHARIDE VACCINE   



  AGE 65 AND OVER   

 

   



  PNEUMOCOCCAL-13   1994  

 

   



  INFLUENZA VACCINE   2018 







Implants







      



  Implanted   Type   Area      Device   Expiration   Model /



      Identifier   Date   Serial /



        Lot

 

      



  Stent Uretl Inlay Maunawili 6fr 22cm   Surgical   Right:   [employer]    2020   [id number] /



  W/ Ziggy Gonzalez Ltxf - [id number]   Stents   Ureter,   DIVISION     /



  Implanted: Qty: 1 on 2017 by    Native      [id number]



  Romaine Jack MD      







Procedures







    



  Procedure Name   Priority   Date/Time   Associated Diagnosis   Comments

 

    



  MS AN ELECTIVE   Routine   2018  



  SUPRAGLOTTIC AIRWAY    1:02 PM CST  

 

  



   Procedure



   Note -



   Gianfranco Zuleta CRNA -



   2018



   1:02 PM



   CST



   Airway



   Date/Time:



   1/3/2018



   12:58 PM



   Performed



   by:



   GIANFRANCO ZULETA



   Authorized



   by: MAIDA GONZALEZ





   Location:



   OR



   Urgency:



   Elective



   Difficult



   Airway: No



   Resident/C



   RNA/AA:



   GIANFRANCO ZULETA



   Performed



   by:



   resident/C



   RNA/AA



   Preoxygena



   guzman with



   100% O2:



   Yes



   C-spine



   Precaution



   s



   Maintained



   Throughout



   : Yes



   Mask



   Ventilatio



   n:  Not



   attempted



   Final



   Airway



   Type:



   Supraglott



   ic airway



   Final LMA:



   Classic



   LMA Size:



   3



   Number of



   Attempts



   at



   Approach:



   1



 

    



  MS AN ELECTIVE   Routine   2017  



  SUPRAGLOTTIC AIRWAY    11:52 AM CST  

 

  



   Procedure



   Note -



   Valencia Story MD -



   2017



   11:52 AM



   CST



   Airway



   Date/Time:



   2017



   11:15 AM



   Performed



   by:



   VALENCIA STORY



   Authorized



   by:



   VALENCIA STORY





   Location:



   OR



   Urgency:



   Elective



   Difficult



   Airway: No



   Preoxygena



   guzman with



   100% O2:



   Yes



   C-spine



   Precaution



   s



   Maintained



   Throughout



   : Yes



   Mask



   Ventilatio



   n:  Easy



   mask



   Final



   Airway



   Type:



   Supraglott



   ic airway



   Final LMA:



   Classic



   LMA Size:



   3



   Number of



   Attempts



   at



   Approach:



   1



 

    



  ECHOCARDIOGRAM 2D   Routine   2017    Results for this



  COMPLETE W MMODE SPECTRAL    4:53 PM CST    procedure are in the



  COLOR DOPPLER (75390)      results section.



after 2017



Results

* XR Hip 2-3 View Left (2018  1:35 PM)





 



  Specimen   Performing Laboratory

 

 



   [facility]



   [address] [place] [address]









 Narrative

 

 



EXAMINATION:XR HIP 2-3 VIEWS LEFT



 



CLINICAL HISTORY:hip pain



 



COMPARISON:None.



 



FINDINGS: Lungs are severely and diffusely osteopenic. The right hip 
demonstrates joint space narrowing and. On the left a left total hip 
arthroplasty is in place. A long stemmed femoral component is present with a 
cerclage wire although there appears to



 be a fracture with separation of the greater trochanter cranially and lesser 
trochanter medially these findings appear unchanged from a prior CT scan from 
2017 there may be more distraction of the fracture fragments.



 



 



IMPRESSION:Postop changes on the left with a left total hip arthroplasty. 
Separation of the greater trochanter superiorly lesser trochanter medially 
stable since previous.



 



Left hip: AP and frog-lateral views: No additional findings of significance are 
noted. There is no evidence of loosening of the prosthesis. The femoral 
component appears well-seated in the acetabular component. The fragmentation of 
the greater and lesser



 trochanters are again noted.



 



IMPRESSION: Postop changes as described with stable fragmentation of the 
greater and lesser trochanters as well as a fragment along lateral shaft the 
proximal diaphyseal region. This also appears chronic



 



 



[id number]



 









 Procedure Note

 

 



 Interface, Radiology Results Incoming - 2018  1:57 PM CST



EXAMINATION:  XR HIP 2-3 VIEWS LEFT



CLINICAL HISTORY:  hip pain



COMPARISON:  None.



FINDINGS: Lungs are severely and diffusely osteopenic. The right hip 
demonstrates joint space narrowing and. On the left a left total hip 
arthroplasty is in place. A long stemmed femoral component is present with a 
cerclage wire although there appears to

 be a fracture with separation of the greater trochanter cranially and lesser 
trochanter medially these findings appear unchanged from a prior CT scan from 
2017 there may be more distraction of the fracture fragments.





IMPRESSION:  Postop changes on the left with a left total hip arthroplasty. 
Separation of the greater trochanter superiorly lesser trochanter medially 
stable since previous.



Left hip: AP and frog-lateral views: No additional findings of significance are 
noted. There is no evidence of loosening of the prosthesis. The femoral 
component appears well-seated in the acetabular component. The fragmentation of 
the greater and lesser

 trochanters are again noted.



IMPRESSION: Postop changes as described with stable fragmentation of the 
greater and lesser trochanters as well as a fragment along lateral shaft the 
proximal diaphyseal region. This also appears chronic





[id number]







* PV duplex venous lower extremity (2018 12:45 PM)





 



  Specimen   Performing Laboratory

 

 



    [facility]



   [address] [place] [address]









 Narrative

 

 



 The left lower extremity was negative for deep vein thrombosis.



 





* XR Abdomen 1 Vw (2018 10:35 AM)





 



  Specimen   Performing Laboratory

 

 



    [facility]



   [address] [place] [address]









 Narrative

 

 



EXAMINATION:XR ABDOMEN 1 VW



 



CLINICAL HISTORY:N20.0 Calculus of kidney, N20.1 Calculus of ureter



 



COMPARISON:CT abdomen 2017



 



FINDINGS:



 



The bowel gas pattern is nonspecific. There is a double-J stent in the right 
ureter, in good position.



 No pathologic masses or calcifications are identified.



 The lung bases are free of acute disease. Regional skeletal structures are 
unremarkable.



 



IMPRESSION:



No plain film evidence of urinary tract calculus.



 



[facility]-[id number]



 









 Procedure Note

 

 



 Interface, Radiology Results Incoming - 2018  2:36 PM CST



EXAMINATION:  XR ABDOMEN 1 VW



CLINICAL HISTORY:  N20.0 Calculus of kidney, N20.1 Calculus of ureter



COMPARISON:  CT abdomen 2017



FINDINGS:



The bowel gas pattern is nonspecific. There is a double-J stent in the right 
ureter, in good position.

 No pathologic masses or calcifications are identified.

 The lung bases are free of acute disease. Regional skeletal structures are 
unremarkable.



IMPRESSION:

No plain film evidence of urinary tract calculus.



[facility]-[id number]







* XR Chest 1 Vw Portable (2017  9:11 AM)



Only the most recent of 3 results within the time period is included.





 



  Specimen   Performing Laboratory

 

 



   [address] [address]









 Narrative

 

 



EXAMINATION:XR CHEST 1 VW PORTABLE



 



CLINICAL HISTORY:Congestive Heart Failure



 



COMPARISON:Most Recent



 



IMPRESSION:



 



Heart and mediastinum are stable. Mild interstitial opacities without acute 
consolidations. Bones are osteopenic. Right shoulder arthrosis. 



 



[facility]-[id number]



 









 Procedure Note

 

 



 Interface, Radiology Results Incoming - 2017  9:15 AM CST



EXAMINATION:  XR CHEST 1 VW PORTABLE



CLINICAL HISTORY:  Congestive Heart Failure



COMPARISON:  Most Recent



IMPRESSION:



Heart and mediastinum are stable. Mild interstitial opacities without acute 
consolidations. Bones are osteopenic. Right shoulder arthrosis. 



[facility]-[id number]







* Estimated GFR (2017  7:07 AM)



Only the most recent of 7 results within the time period is included.





  



  Component   Value   Ref Range

 

  



  GFR Non Af Amer   33 (A)   mL/min/1.73 m2

 

  



  GFR Af Amer   40 (A)   mL/min/1.73 m2



   Comment: 



   Chronic kidney disease: <60 mL/min/1.73m2 



   Kidney failure: <15 mL/min/1.73m2 



   The estimated GFR is calculated from the 



   IDMS-traceable Modification of Diet 



   in Renal Disease Equation. The accuracy of the 



   calculation is poor when the 



   creatinine is normal. Calculated values >90 



   mL/min/1.73m2 are not reported. 



   This equation has not been validated in children 



   (<18 years), pregnant 



   women, the elderly (>70 years), or ethnic groups 



   other than Caucasians and 



    Americans. 









 



  Specimen   Performing Laboratory

 

 



  Plasma specimen   [facility]



   [address]



   [place] [address]





* CBC with platelet and differential (2017  7:07 AM)



Only the most recent of 8 results within the time period is included.





  



  Component   Value   Ref Range

 

  



  WBC   9.4   4.2 - 11.0 k/uL

 

  



  RBC   3.30 (L)   4.04 - 5.86 m/uL

 

  



  HGB   9.8 (L)   11.5 - 15.3 g/dL

 

  



  HCT   30.0 (L)   34.0 - 45.0 %

 

  



  MCV   90.9   80.0 - 98.0 fL

 

  



  MCH   29.7   27.0 - 34.0 pg

 

  



  MCHC   32.7   31.5 - 36.5 g/dL

 

  



  RDW - SD   47.7   37.0 - 51.0 fL

 

  



  MPV   10.6 (H)   7.4 - 10.4 fL

 

  



  Platelet count   194   150 - 400 k/uL

 

  



  Nucleated RBC   0.00   /100 WBC

 

  



  Neutrophils   74.1 (H)   36.0 - 66.0 %

 

  



  Lymphocytes   18.4 (L)   24.0 - 44.0 %

 

  



  Monocytes   5.6   0.0 - 6.0 %

 

  



  Eosinophils   1.0   0.0 - 6.0 %

 

  



  Basophils   0.1   0.0 - 1.2 %

 

  



  Immature granulocytes   0.8   0.0 - 1.0 %









 



  Specimen   Performing Laboratory

 

 



  Blood   [facility]



   [address]



   [place] [address]





* Phosphorus level (2017  7:07 AM)



Only the most recent of 4 results within the time period is included.





  



  Component   Value   Ref Range

 

  



  Phosphorus   3.5   2.5 - 4.5 mg/dL









 



  Specimen   Performing Laboratory

 

 



  Plasma specimen   [facility]



   [address]



   [place] [address]





* Magnesium level (2017  7:07 AM)



Only the most recent of 4 results within the time period is included.





  



  Component   Value   Ref Range

 

  



  Magnesium   2.10   1.60 - 2.40 mg/dL









 



  Specimen   Performing Laboratory

 

 



  Plasma specimen   [facility]



   [address] [place] [address] [address]



   [place] [address]





* Basic metabolic panel (2017  7:07 AM)



Only the most recent of 5 results within the time period is included.





  



  Component   Value   Ref Range

 

  



  Sodium   143   135 - 150 mEq/L

 

  



  Potassium   3.4 (L)   3.5 - 5.0 mEq/L

 

  



  Chloride   107   100 - 109 mEq/L

 

  



  CO2   29   24 - 32 mmol/L

 

  



  Anion gap   7   7 - 15 mEq/L



   Comment: 



   Starting from  , anion gap 



   calculation 



   no longer incorporates potassium. Please note the 



   change. 

 

  



  BUN   31 (H)   7 - 18 mg/dL

 

  



  Creatinine   1.5   0.8 - 1.5 mg/dL

 

  



  Glucose   87   65 - 100 mg/dL

 

  



  Calcium   7.7 (L)   8.6 - 10.7 mg/dL









 



  Specimen   Performing Laboratory

 

 



  Plasma specimen   [facility]



   [address]



   [place] [address]





* FL Pyelogram Retrograde (2017 11:48 AM)





 



  Specimen   Performing Laboratory

 

 



    [facility]



   [address] [place] [address]









 Narrative

 

 



EXAMINATION:FL PYELOGRAM RETROGRADE



 



CLINICAL HISTORY:Right urinary tract calculus



 



COMPARISON:None.



 



TECHNIQUE:



C-arm fluoroscopy was performed with 3 fluoroscopic spot images taken of the 
central abdomen and a portion of the upper pelvis upper pelvis in the anterior 
projection in a narrow field of view in the OR.



 



A total KV of79, and mA of 2.9, and fluoroscopic time of28 seconds was 
offered to  for the procedure.



 



The DAP (dose absorbed product) is: 4045 mGy-cm^2



 



A total of5 mL of Omnipaque 300 was injected by .



 



FINDINGS:



 



The  film demonstrates an oval opaque density projected to the right of 
the L3-L4 disc interspace level and represents the proximal right ureteric 
calculus.



 



On subsequent image the density presents as a filling defect with poor flow of 
contrast proximal to this site. Distal to this filling defect isn't opacified 
right ureter were which is unremarkable.



 



The final image demonstrate satisfactory placement of right upper urinary tract 
stent.



 



IMPRESSION:



 



Radiopaque calculus in the proximal right ureter.



 



Satisfactory placement of right upper urinary tract stent.



 



 [id number]



 









 Procedure Note

 

 



 Interface, Radiology Results Incoming - 2017  3:56 PM CST



EXAMINATION:  FL PYELOGRAM RETROGRADE



CLINICAL HISTORY:  Right urinary tract calculus    



COMPARISON:  None.



TECHNIQUE:

C-arm fluoroscopy was performed with 3 fluoroscopic spot images taken of the 
central abdomen and a portion of the upper pelvis upper pelvis in the anterior 
projection in a narrow field of view in the OR.



A total KV of  79, and mA of 2.9, and fluoroscopic time of  28 seconds was 
offered to Dr. Jack for the procedure.



The DAP (dose absorbed product) is: 4045 mGy-cm^2



A total of  5 mL of Omnipaque 300 was injected by Dr. Jack.



FINDINGS:



The  film demonstrates an oval opaque density projected to the right of 
the L3-L4 disc interspace level and represents the proximal right ureteric 
calculus.



On subsequent image the density presents as a filling defect with poor flow of 
contrast proximal to this site. Distal to this filling defect isn't opacified 
right ureter were which is unremarkable.



The final image demonstrate satisfactory placement of right upper urinary tract 
stent.



IMPRESSION:



Radiopaque calculus in the proximal right ureter.



Satisfactory placement of right upper urinary tract stent.



 [id number]







* Troponin (2017  7:23 AM)



Only the most recent of 6 results within the time period is included.





  



  Component   Value   Ref Range

 

  



  Troponin   0.07   0.00 - 0.60 ng/mL



   Comment: 



   0.11 - 1.49 ng/ml                May indicate 



   increased risk of acute 



   coronary 



   syndrome. 



   >=1.5 ng/ml                            Consistent 



   with acute myocardial 



   infarction. 



   The diagnostic value of a single normal or 



   non-diagnostic 



   result is questionable.    Serial samples at 2-6 



   hour intervals 



   are required to rule out acute myocardial 



   injury. 









 



  Specimen   Performing Laboratory

 

 



  Plasma specimen   [facility]



   [address]



   [place] [address]





* Manual differential (2017  7:23 AM)



Only the most recent of 2 results within the time period is included.





  



  Component   Value   Ref Range

 

  



  Manual differential   PERFORMED 

 

  



  Neutrophils   94.0 (H)   36.0 - 66.0 %

 

  



  Lymphocytes   4.0 (L)   24.0 - 44.0 %

 

  



  Monocytes   2.0   0.0 - 6.0 %

 

  



  Eosinophils   0.0   0.0 - 6.0 %

 

  



  Basophils   0.0   0.0 - 1.2 %

 

  



  Metamyelocytes   0   0 - 1 %

 

  



  Promyelocytes   0   0 - 1 %

 

  



  Platelet slide review   Karly adequate 

 

  



  Enlarged platelets   Few 









 



  Specimen   Performing Laboratory

 

 



   [facility]



   [address]



   [place] [address]





* Partial thromboplastin time, activated (2017  7:23 AM)



Only the most recent of 2 results within the time period is included.





  



  Component   Value   Ref Range

 

  



  PTT   205.5 (HH)   23.0 - 36.0 sec



   Comment: 



   PTT therapeutic range for unfractionated heparin 



   is 



   61.0-112.0 seconds which corresponds to Anti-Xa 



   0.3-0.7 U/ml. 



   Note:    Change in Panic Value 



   The PTT Panic Value is changing from 110 sec. to 



   100 sec. 



   due to new instrumentation and reagents. 



   Correlation studies have been performed to 



   validate this result. 



   Results called to and read back by SONIA KIRK at    08:14    2017 WEB 



   REPEAT RZD=207.7 









 



  Specimen   Performing Laboratory

 

 



  Blood   [facility]



   [address] [place] [address] [address]



   [place] [address]





* Prothrombin time with INR (2017  7:23 AM)



Only the most recent of 2 results within the time period is included.





  



  Component   Value   Ref Range

 

  



  Prothrombin time   15.7 (H)   12.0 - 15.0 sec

 

  



  INR   1.23 (H)   0.92 - 1.12



   Comment: 



   For patients on anticoagulant therapy, reference 



   ranges below: 



   Indication: 



   INR Value 



   Treatment of Venous 



   Thrombosis,                     2.0-3.0 



   pulmonary emboli, or prophylaxis 



   of a venous thrombosis, or systemic 



   emboli. 



   High dose, high risk 



   patients                         3.0-4.5 



   with mechanical valves. 



   NOTE:    INR values over 3.0 are sometimes 



   associated with 



   gastrointestinal hemorrhage, especially values 



   over 4.0. 









 



  Specimen   Performing Laboratory

 

 



  Blood   [facility]



   [address] [place] [address] [address]



   [place] [address]





* Anti Xa, unfractionated (2017  7:23 AM)



Only the most recent of 7 results within the time period is included.





  



  Component   Value   Ref Range

 

  



  Anti Xa, unfractionated   0.45Comment: Therapeutic Range:  0.30 - 0.70 U/mL   
0.30 - 0.70 U/mL









 



  Specimen   Performing Laboratory

 

 



  Blood   [facility]



   [address] [place] [address]



   [place] [address]





* T3, free (2017  7:23 AM)





  



  Component   Value   Ref Range

 

  



  T3, free   0.68 (L)   2.18 - 3.98 pmol/L









 



  Specimen   Performing Laboratory

 

 



  Plasma specimen   [facility]



   [address] [place] [address]





* Thyroid stimulating hormone (2017  7:23 AM)





  



  Component   Value   Ref Range

 

  



  TSH   0.29 (L)   0.38 - 4.82 uIU/mL









 



  Specimen   Performing Laboratory

 

 



  Plasma specimen   [facility]



   [address] [place] [address]





* B natriuretic peptide (2017  7:23 AM)



Only the most recent of 2 results within the time period is included.





  



  Component   Value   Ref Range

 

  



  BNP   1,360 (H)   0 - 100 pg/mL









 



  Specimen   Performing Laboratory

 

 



  Blood   [facility]



   [address] [place] [address]





* Hemoglobin A1c (2017  7:23 AM)





  



  Component   Value   Ref Range

 

  



  Hemoglobin A1C   6.2 (H)   4.0 - 6.0 %



   Comment: 



   ************************************************** 



   *** 



   Less than 6% -                 Goal of therapy for 



   Type II Diabetes 



   Less than 7%-                    Goal of therapy 



   for Type I Diabetes 



   Less than 8%-                    Acceptable 



   control for Type I or Type 



   II 



   Diabetes 



   Greater than 8%-                Unacceptable 



   control; action indicated. 



   (A 



   DA94) 









 



  Specimen   Performing Laboratory

 

 



  Blood   [facility]



   [address] [place] [address]





* Ionized calcium (2017  7:23 AM)



Only the most recent of 2 results within the time period is included.





  



  Component   Value   Ref Range

 

  



  pH   7.37 

 

  



  Ionized calcium   1.06 (L)   1.11 - 1.32 mmol/L









 



  Specimen   Performing Laboratory

 

 



  Plasma specimen   [facility]



   [address] [place] [address]





* Lipid panel (2017  7:23 AM)





  



  Component   Value   Ref Range

 

  



  Cholesterol   113 (L)   120 - 200 mg/dL

 

  



  Triglycerides   131   50 - 150 mg/dL

 

  



  HDL cholesterol   16 (L)   40 - 60 mg/dL

 

  



  LDL cholesterol   82Comment: Result obtained by direct LDL   mg/dL



   measurement 

 

  



  Lipid panel   See below 



  interpretation   Comment: 



   Total Cholesterol (mg/dL) 



   LDL Cholesterol (mg/dL) 



   <200 



   Desirable                                <100 



   Optimal 



   200-239            Borderline-high 



   100-129            Near or above optimal 



   >=240                High 



   130-159            Borderline-high 



   160-189            High 



   >=190                Very high 



   HDL Cholesterol 



   (mg/dL)                             Triglycerides 



   (mg/dL) 



   <40                    Low 



   <150                 Normal 



   >=60 



   High 



   150-199            Borderline-high 



   200-499            High 



   >=500                Very high 



   Risk Catergories that modify LDL goals. 



   Risk 



   Catergories 



   LDL goal (mg/dL) 



   CHD and CHD risk 



   equivalent                        <100 



   (10-year risk >20%) 



   Multiple (2+) risk factors 



   <130 



   (10-year risk=<20%) 



   0-1 risk 



   factors 



   <160 



   (<10-year 



   risk) 



   Defining levels of lipids in metabolic syndrome 



   Triglycerides 



   >=150 mg/dL 



   HDL 



   Cholesterol 



   Men 



   <40 mg/dL 



   Women 



   <50 mg/dL 



   Non-HDL cholesterol is a second target for therapy 



   in persons 



   with high triglycerides (>=200 



   mg/dL) 









 



  Specimen   Performing Laboratory

 

 



  Plasma specimen   [facility]



   [address]



   [place] [address]





* Comprehensive metabolic panel (2017  7:23 AM)



Only the most recent of 2 results within the time period is included.





  



  Component   Value   Ref Range

 

  



  Sodium   141   135 - 150 mEq/L

 

  



  Potassium   4.2   3.5 - 5.0 mEq/L

 

  



  Chloride   110 (H)   100 - 109 mEq/L

 

  



  CO2   20 (L)   24 - 32 mmol/L

 

  



  Anion gap   11   7 - 15 mEq/L



   Comment: 



   Starting from  , anion gap 



   calculation 



   no longer incorporates potassium. Please note the 



   change. 

 

  



  BUN   28 (H)   7 - 18 mg/dL

 

  



  Creatinine   1.5   0.8 - 1.5 mg/dL

 

  



  Glucose   129 (H)   65 - 100 mg/dL

 

  



  Calcium   7.4 (L)   8.6 - 10.7 mg/dL

 

  



  Protein   6.9   6.3 - 8.2 g/dL

 

  



  Albumin   2.0 (L)   3.2 - 5.0 g/dL

 

  



  A/G ratio   0.4 (L)   0.7 - 3.8

 

  



  Alkaline phosphatase   135 (H)   30 - 120 U/L

 

  



  AST   19   15 - 37 U/L

 

  



  ALT   25 (L)   30 - 65 U/L

 

  



  Total bilirubin   0.6   0.2 - 1.2 mg/dL









 



  Specimen   Performing Laboratory

 

 



  Plasma specimen   [facility]



   [address]



   [place] [address]





* Lactic acid level, SEPSIS - Now and repeat 2x every 3 hours (2017  1:18 
PM)



Only the most recent of 2 results within the time period is included.





  



  Component   Value   Ref Range

 

  



  Lactic acid   1.5   0.5 - 2.2 mmol/L









 



  Specimen   Performing Laboratory

 

 



  Blood   [facility]



   [address]



   [place] [address]





* Occult blood, stool (2017 10:58 AM)





  



  Component   Value   Ref Range

 

  



  Occult blood, stool   Negative for occult blood. 



   Comment: 



   Specimen Information 



   Specimen Source: Stool 



   Specimen Site: Not otherwise specified 









 



  Specimen   Performing Laboratory

 

 



  Stool - Not otherwise   [facility]



  specified   [address]



   [place] [address]





* Lactic acid level (2017  5:50 AM)



Only the most recent of 3 results within the time period is included.





  



  Component   Value   Ref Range

 

  



  Lactic acid   1.2   0.5 - 2.2 mmol/L









 



  Specimen   Performing Laboratory

 

 



  Blood   [facility]



   [address]



   [place] [address]





* Blood culture, aerobic & anaerobic (2017  1:00 AM)



Only the most recent of 4 results within the time period is included.





  



  Component   Value   Ref Range

 

  



  Blood culture isolate   No growth after 5 days of incubation. 



   Comment: 



   Specimen Information 



   Specimen Source: Blood 



   Specimen Site: Left Hand 









 



  Specimen   Performing Laboratory

 

 



  Blood   [facility] DEPARTMENT OF PATHOLOGY AND GENOMIC MEDICINE



   [address] [place] [address]





* Influenza antigen (2017 12:52 AM)



Only the most recent of 2 results within the time period is included.





  



  Component   Value   Ref Range

 

  



  Influenza antigen   Negative for Influenza A/B antigen. 



   Comment: 



   Specimen Information 



   Specimen Source: Nares 



   Specimen Site: Left 









 



  Specimen   Performing Laboratory

 

 



  Nares - Left   [facility]



   [address]



   [place] [address]





* Urinalysis screen and microscopy, with reflex to culture (2017 10:05 PM)



Only the most recent of 2 results within the time period is included.





  



  Component   Value   Ref Range

 

  



  Specimen site   Clean catch 

 

  



  Color, UA   Yellow 

 

  



  Appearance, UA   Slightly-Cloudy 

 

  



  Specific gravity, UA   1.004   1.001 - 1.035

 

  



  pH, UA   7.0   5.0 - 8.5

 

  



  Protein, UA   Negative   Negative

 

  



  Glucose, UA   Negative   Negative

 

  



  Ketones, UA   Negative   Negative

 

  



  Bilirubin, UA   Negative   Negative

 

  



  Blood, UA   Small (A)   Negative

 

  



  Nitrite, UA   Negative   Negative

 

  



  Urobilinogen, UA   Negative   <2.0

 

  



  Leukocyte esterase, UA   Large (A)   Negative

 

  



  Epithelial cells, UA   Few   /HPF

 

  



  WBC, UA   75 (H)   0 - 5 /HPF

 

  



  RBC, UA   20 (A)   0 - 5 /HPF

 

  



  Bacteria, UA   Trace   None seen

 

  



  Yeast, UA   None seen 

 

  



  Yeast with pseudohyphae,   None seen 



  UA  









 



  Specimen   Performing Laboratory

 

 



  Urine   [facility]



   [address] [place] [address] [address]



   [place] [address]





* Sodium level, urine, random (2017 10:05 PM)





  



  Component   Value   Ref Range

 

  



  Sodium, urine, random   57   mEQ/L









 



  Specimen   Performing Laboratory

 

 



  Urine   [facility]



   [address] [place] [address]





* Gram stain (2017 10:05 PM)



Only the most recent of 2 results within the time period is included.





  



  Component   Value   Ref Range

 

  



  Gram stain result   Occasional WBC's 



   No organisms seen 



   Comment: 



   Specimen Information 



   Specimen Source: Urine 



   Specimen Site: See UA 









 



  Specimen   Performing Laboratory

 

 



  Urine   [facility] DEPARTMENT OF PATHOLOGY AND GENOMIC MEDICINE



   [address] [place] [address]





* Urine culture (2017 10:05 PM)



Only the most recent of 2 results within the time period is included.





  



  Component   Value   Ref Range

 

  



  Urine culture isolate   No growth after 2 days. 



   Comment: 



   Specimen Information 



   Specimen Source: Urine 



   Specimen Site: See UA 









 



  Specimen   Performing Laboratory

 

 



  Urine   [facility] DEPARTMENT OF PATHOLOGY AND GENOMIC MEDICINE



   [address] [place] [address]





* Vitamin D 25 hydroxy level (2017  6:01 AM)





  



  Component   Value   Ref Range

 

  



  Vitamin D, 25-hydroxy   14.7 (L)   30.0 - 150.0 ng/mL



   Comment: 



   This assay reports the sum of 25-hydroxy vitamin 



   D3 and 25-hydroxy vitamin 



   D2. 



   Reference range: 



   0-17 years: 



   Deficiency: less than 20ng/mL 



   Optimum level: greater than or equal to 20 ng/mL. 



   18 years and older: 



   Deficiency: less than 20ng/mL 



   Insufficiency: 20-29 ng/mL 



   Optimum Level: 30-80 ng/mL 



   The assay reportable range is 3.4    155.9 ng/mL. 



   Levels higher than 150 



   ng/mL may be associated with toxicity. 



   If toxicity is clinically suspected and the 



   reported result is >155.9 



   ng/mL,contact lab for alternative methods to 



   obtain a definitive    level. 



   If separate quantitation of 25-hydroxy vitamin D3 



   and 25-hydroxy vitamin D2 



   is needed, please contact lab for alternative 



   methods. 









 



  Specimen   Performing Laboratory

 

 



  Blood   [facility] DEPARTMENT OF PATHOLOGY AND GENOMIC MEDICINE



   [address] [place] [address]





* Parathyroid hormone (2017  6:01 AM)





  



  Component   Value   Ref Range

 

  



  PTH   107 (H)   15 - 65 pg/mL









 



  Specimen   Performing Laboratory

 

 



  Blood   [facility] DEPARTMENT OF PATHOLOGY AND GENOMIC MEDICINE



   [address] [place] [address]





* Hepatic function panel (2017  6:01 AM)





  



  Component   Value   Ref Range

 

  



  Albumin   1.9 (L)   3.2 - 5.0 g/dL

 

  



  Total bilirubin   0.7   0.2 - 1.2 mg/dL

 

  



  Bilirubin direct   0.3   0.0 - 0.4 mg/dL

 

  



  Alkaline phosphatase   148 (H)   30 - 120 U/L

 

  



  Protein   6.5   6.3 - 8.2 g/dL

 

  



  ALT   27 (L)   30 - 65 U/L

 

  



  AST   32   15 - 37 U/L









 



  Specimen   Performing Laboratory

 

 



  Plasma specimen   [facility]



   [address]



   [place] [address]





* Creatinine level, urine, random (2017  1:06 AM)





  



  Component   Value   Ref Range

 

  



  Creatinine, urine, random   35   mg/dL









 



  Specimen   Performing Laboratory

 

 



  Urine   [facility]



   [address]



   [place] [address]





* CBC hemogram (2017 10:48 PM)





  



  Component   Value   Ref Range

 

  



  WBC   10.5   4.2 - 11.0 k/uL

 

  



  RBC   3.09 (L)   4.04 - 5.86 m/uL

 

  



  HGB   9.3 (L)   11.5 - 15.3 g/dL

 

  



  HCT   27.0 (L)   34.0 - 45.0 %

 

  



  MCV   87.4   80.0 - 98.0 fL

 

  



  MCH   30.1   27.0 - 34.0 pg

 

  



  MCHC   34.4   31.5 - 36.5 g/dL

 

  



  RDW - SD   43.3   37.0 - 51.0 fL

 

  



  MPV   11.0 (H)   7.4 - 10.4 fL

 

  



  Platelet count   138 (L)   150 - 400 k/uL

 

  



  Nucleated RBC   0.00   /100 WBC









 



  Specimen   Performing Laboratory

 

 



  Blood   [facility]



   [address]



   [place] [address]





* US Renal (2017 10:07 PM)





 



  Specimen   Performing Laboratory

 

 



    [facility]



   [address] [place] [address]









 Narrative

 

 



EXAMINATION:US RENAL



 



CLINICAL HISTORY:Elevated abnormal renal function tests



 



COMPARISON:CT abdomen and pelvis dated 2017



 



FINDINGS: 



 



The right kidney measures 11.4 x 5.3 x 6.5 cm. There is a 16 mm shadowing stone 
identified in the right kidney parenchyma. There is moderate right-sided 
hydronephrosis. This is also noted on prior CT where there is a right UPJ 
stone. There is no mass or 



cyst.



 



Left kidney measures 9.4 x 5.1 x 4.5 cm. There is no mass, cyst or stone. There 
is no hydronephrosis.



 



The bladder is only minimally filled. Bilateral flow jets are noted.



 



 



 



IMPRESSION:



 



Renal ultrasound confirms a large 16 mm stone in the right kidney parenchymal 
as well as moderate right-sided hydronephrosis as noted on prior CT.



 



 



[facility]-[id number]



 









 Procedure Note

 

 



 Interface, Radiology Results Incoming - 2017 10:15 PM CST



EXAMINATION:  US RENAL



CLINICAL HISTORY:  Elevated abnormal renal function tests



COMPARISON:  CT abdomen and pelvis dated 2017



FINDINGS: 



The right kidney measures 11.4 x 5.3 x 6.5 cm. There is a 16 mm shadowing stone 
identified in the right kidney parenchyma. There is moderate right-sided 
hydronephrosis. This is also noted on prior CT where there is a right UPJ 
stone. There is no mass or 

cyst.



Left kidney measures 9.4 x 5.1 x 4.5 cm. There is no mass, cyst or stone. There 
is no hydronephrosis.



The bladder is only minimally filled. Bilateral flow jets are noted.







IMPRESSION:



Renal ultrasound confirms a large 16 mm stone in the right kidney parenchymal 
as well as moderate right-sided hydronephrosis as noted on prior CT.





[facility]-[id number]







* ECG ED Preliminary Interpretation - NOT AN ORDER (2017  6:00 PM)





 Unique De MD 20176:00 PM



ECG ED Preliminary Interpretation - Not an Order



Performed by: RAKESH DE



Authorized by: RAKESH DE 



 



Rate: 



ECG rate:60



ECG rate assessment: normal



Rhythm: 



Rhythm: sinus rhythm



Ectopy: 



Ectopy: none



QRS: 



QRS axis:Normal



ST segments: 



ST segments:Normal



T waves: 



T waves: normal





* Echocardiogram complete w contrast and 3D if needed (2017  4:53 PM)





  



  Component   Value   Ref Range

 

  



  Velocity Ratio (V1/V2)   0.63   m/s

 

  



  AoV Mean PG   7.33   mmHg

 

  



  AV LVOT peak gradient   5.65   mmHg

 

  



  MV mean gradient   2.32   mmHg

 

  



  MV valve area p 1/2   3.39   cm2



  method  

 

  



  PV Pk Grad   3.92   mmHg

 

  



  E/A ratio   0.72 

 

  



  E wave decelartion time   207.59   msec

 

  



  LVOT Diam,S   1.83   cm

 

  



  LVOT area   2.63   cm2

 

  



  LVOT Vmax   1.19   m/s

 

  



  LVOT VTI   0.25   m

 

  



  AoV Peak PG   14.19   mmHg

 

  



  MV Peak E Braeden   0.93   m/s

 

  



  MV stenosis pressure 1/2   64.85   ms



  time  

 

  



  MV Peak A Braeden   1.30   m/s

 

  



  AoV Area, Vmax   1.81   cm2

 

  



  AoV Area, VTI   2.01   cm2

 

  



  AoV Vmax   1.88   m/s

 

  



  Left Atrium Dimension   2.85   cm



  Anterior  

 

  



  PV VMAX   0.99   m/s

 

  



  RVSP (TR)   40.83   mmHg

 

  



  TR Vpeak   2.78   mm/s

 

  



  MV E A ratio   0.82   mmHg

 

  



  TR pk grad   30.83   mmHg

 

  



  MR peak grad   7.58   mmHg

 

  



  RVSP   40.83   mmHg

 

  



  Ao Root Diameter   3.20   cm

 

  



  AR Press Half Time   258.57   ms

 

  



  LVOT CO   4.54   l/min

 

  



  LVOT HR for LVOT CO   69.14   bpm

 

  



  MV Vmax   1.38   m

 

  



  MV VTI Tips   0.29   m

 

  



  AoV Vmn   1.27 

 

  



  AR slope   3.56 

 

  



  Ar Vmax   3.17 

 

  



  Ao Root Diameter   3.20   cm

 

  



  AoV VTI   0.39   m

 

  



  MV AE ratio   1.30 

 

  



  LVOT Vmn   0.85 

 

  



  Aov area Vmn   1.82   cm2

 

  



  LVOT mean grad   3.27   mmHg

 

  



  MAX Pred HR   131.75 

 

  



  85 of MPHR   111.99 

 

  



  AR DT   891.64   msec

 

  



  AR pk grad   40.28   mmHg

 

  



  Calc MPHR   131.75   bpm

 

  



  MV Decel slope   4.49   m/s2

 

  



  Pred Exer Dur R1   4.85 

 

  



  Pred METS R1   3.23 









 



  Specimen   Performing Laboratory

 

 



    [facility]



   [address] [place] [address]









 Narrative

 

 



 There is mild left ventricular concentric hypertrophy.



 Left Ventricular ejection fraction is 50 - 55%.



 Right ventricular size is normal.



 Left atrium size is mildly dilated.



 Trace mitral valve regurgitation



 The mitral valve appears thickened.



 No pericardial effusion



 





* MRI Lumbar Spine Wo Contrast (2017 12:59 PM)





 



  Specimen   Performing Laboratory

 

 



   [place] [address]









 Narrative

 

 



EXAMINATION:MRI LUMBAR SPINE WO CONTRAST



 



COMPARISON:None



 



CLINICAL HISTORY:incontinence



 



FINDINGS: Other than minor degenerative changes, the discs are unremarkable 
without significant bulge or protrusion. There is a very tiny anterior 
listhesis of L4 on L5 which in combination with facet hypertrophy and 
ligamentum flavum thickening results 



in relative narrowing of the spinal canal, but no true stenosis. The neural 
foramina are patent. The conus medullaris is normal. The bone marrow is 
unremarkable. There is L4-5 and L5-S1 degenerative facet change.



 



IMPRESSION: Unremarkable for age.



 



[facility]-[id number]



 









 Procedure Note

 

 



 Interface, Radiology Results Incoming - 2017  1:13 PM CST



EXAMINATION:  MRI LUMBAR SPINE WO CONTRAST



COMPARISON:  None



CLINICAL HISTORY:  incontinence



FINDINGS: Other than minor degenerative changes, the discs are unremarkable 
without significant bulge or protrusion. There is a very tiny anterior 
listhesis of L4 on L5 which in combination with facet hypertrophy and 
ligamentum flavum thickening results 

in relative narrowing of the spinal canal, but no true stenosis. The neural 
foramina are patent. The conus medullaris is normal. The bone marrow is 
unremarkable. There is L4-5 and L5-S1 degenerative facet change.



IMPRESSION: Unremarkable for age.



[facility]-[id number]







* CT Head Wo Contrast (2017 12:46 PM)





 



  Specimen   Performing Laboratory

 

 



   [place] [address]









 Narrative

 

 



EXAMINATION:CT HEAD WO CONTRAST



 



COMPARISON:None



 



CLINICAL HISTORY:incontinence



 



TECHNIQUE: Up to date CT equipment and radiation dose reduction technique were 
utilized.



 



FINDINGS: There are small vessel ischemic changes of the white matter. There 
are no other areas of abnormal density. There is no mass or extra-axial fluid 
collection. The ventricles and sulci are prominent compatible with age-related 
volume loss. There 



are calcifications in the internal carotid arteries. The sinuses and mastoids 
are clear.



 



IMPRESSION: Chronic age-related changes.



 



[facility]-[id number]



 









 Procedure Note

 

 



 Interface, Radiology Results Incoming - 2017 12:56 PM CST



EXAMINATION:  CT HEAD WO CONTRAST



COMPARISON:  None



CLINICAL HISTORY:  incontinence



TECHNIQUE: Up to date CT equipment and radiation dose reduction technique were 
utilized.



FINDINGS: There are small vessel ischemic changes of the white matter. There 
are no other areas of abnormal density. There is no mass or extra-axial fluid 
collection. The ventricles and sulci are prominent compatible with age-related 
volume loss. There 

are calcifications in the internal carotid arteries. The sinuses and mastoids 
are clear.



IMPRESSION: Chronic age-related changes.



[facility]-[id number]







* CT Abdomen Pelvis Wo Contrast (2017 12:46 PM)





 



  Specimen   Performing Laboratory

 

 



    [facility]



   [address] [place] [address]









 Narrative

 

 



EXAMINATION:CT ABDOMEN PELVIS WO CONTRAST



 



CLINICAL HISTORY:urine infection please do without ORAL or IV contrast



 



TECHNIQUE: Multiple axial images of the abdomen and pelvis were obtained 
without intravenous administration of iodinated contrast. Sagittal and coronal 
computerized reformatted images were also obtained. The lack of intravenous 
contrast reduces the 



sensitivity of detecting solid organ disease.



 



CT imaging was performed with iterative reconstruction technique and/or 
automated exposure control to reduce radiation dose.



 



COMPARISON:None.



 



IMPRESSION: 



1.Mild dependent atelectasis in the visualized lung bases. Top normal heart 
size. Coronary calcifications.



2.Normal liver, adrenals, spleen, and pancreas. The gallbladder is 
surgically absent.



3.1.5 cm elongated calcification in the right kidney. 7.9 mm stone in the 
proximal right ureter. Mild right hydronephrosis. Normal left kidney.



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 
the deep pelvis. The bladder is moderately distended but otherwise 
unremarkable. Uterus within normal limits. No free fluid.



5.Small hiatal hernia. Unremarkable small bowel. Unremarkable colon.



6.Generalized bone demineralization and degenerative changes.



7.Atherosclerotic calcifications of the abdominal aorta and its branches.



 



SUMMARY:



1.7.9 mm stone in the proximal right ureter with mild right hydronephrosis. 
Additional elongated calcification within the right kidney, possible additional 
stone. Query whether this could represent calcification around retained 
catheter fragment and 



recommend correlation for any prior history of nephrostomy catheters or 
ureteral stent.



2.Other findings as above.



 



[facility]-[id number]



 









 Procedure Note

 

 



 Interface, Radiology Results Incoming - 2017 12:56 PM CST



EXAMINATION:  CT ABDOMEN PELVIS WO CONTRAST



CLINICAL HISTORY:  urine infection please do without ORAL or IV contrast



TECHNIQUE: Multiple axial images of the abdomen and pelvis were obtained 
without intravenous administration of iodinated contrast. Sagittal and coronal 
computerized reformatted images were also obtained. The lack of intravenous 
contrast reduces the 

sensitivity of detecting solid organ disease.



CT imaging was performed with iterative reconstruction technique and/or 
automated exposure control to reduce radiation dose.



COMPARISON:  None.



IMPRESSION: 

 1.  Mild dependent atelectasis in the visualized lung bases. Top normal heart 
size. Coronary calcifications.

 2.  Normal liver, adrenals, spleen, and pancreas. The gallbladder is 
surgically absent.

 3.  1.5 cm elongated calcification in the right kidney. 7.9 mm stone in the 
proximal right ureter. Mild right hydronephrosis. Normal left kidney.

 4.  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 
the deep pelvis. The bladder is moderately distended but otherwise 
unremarkable. Uterus within normal limits. No free fluid.

 5.  Small hiatal hernia. Unremarkable small bowel. Unremarkable colon.

 6.  Generalized bone demineralization and degenerative changes.

 7.  Atherosclerotic calcifications of the abdominal aorta and its branches.



SUMMARY:

 1.  7.9 mm stone in the proximal right ureter with mild right hydronephrosis. 
Additional elongated calcification within the right kidney, possible additional 
stone. Query whether this could represent calcification around retained 
catheter fragment and 

recommend correlation for any prior history of nephrostomy catheters or 
ureteral stent.

 2.  Other findings as above.



[facility]-[id number]







* ECG 12 lead (2017  9:50 PM)





  



  Component   Value   Ref Range

 

  



  Ventricular rate   60 

 

  



  Atrial rate   60 

 

  



  MS interval   178 

 

  



  QRSD interval   80 

 

  



  QT interval   438 

 

  



  QTC interval   438 

 

  



  P axis 1   18 

 

  



  QRS axis 1   60 

 

  



  T wave axis   55 

 

  



  EKG impression   Normal sinus rhythm-Normal ECG-No previous ECGs 



   available-Electronically Signed By Vanessa Wiggins MD ([id number]) on 2017 7:11:12 AM 









 



  Specimen   Performing Laboratory

 

 



   [facility]



   [address] [place] [address]





* Creatine kinase, total (CPK) (2017  9:25 PM)





  



  Component   Value   Ref Range

 

  



  Creatine kinase   458 (H)   61 - 224 U/L









 



  Specimen   Performing Laboratory

 

 



  Plasma specimen   [facility]



   [facility] [address]



   [place] [address]





after 2017



Insurance







     



  Payer   Benefit   Subscriber ID   Type   Phone   Address



   Plan /    



   Group    

 

     



  HUMANA MEDICARE   HUMANA   xxxxxxxxx   PPO  



   MEDICARE    



   PPO/PFFS/E    



   [facility]    

 

     



  AMERIGROUP   AMERIGROUP   xxxxxxxxx   O  



   STAR+PLUS    



   [facility]    









     



  Guarantor Name   Account   Relation to   Date of   Phone   Billing Address



   Type   Patient   Birth  

 

     



  MELINASAMUELGINGER   Personal/F   Self   1929   Home:   [address]     [phone]   [place] [address]

## 2018-04-30 NOTE — XMS REPORT
Patient Summary Document

 Created on: 2018



LISBETH PRATER

External Reference #: [id number]

: 1929

Sex: Female



Demographics







 Address  [address] [place]  [address]

 

 Home Phone  [phone]

 

 Preferred Language  Unknown

 

 Marital Status  Unknown

 

 Worship Affiliation  Unknown

 

 Race  Unknown

 

 Ethnic Group  Unknown





Author







 [facility]  [facility]

 

 [facility]

 

 Address  Unknown

 

 Phone  Unavailable







Care Team Providers







 Care Team Member Name  Role  Phone

 

 ASHLEY KIMBALL  Unavailable  Unavailable







Problems

This patient has no known problems.



Allergies, Adverse Reactions, Alerts

This patient has no known allergies or adverse reactions.



Medications

This patient has no known medications.



Results







 Test Description  Test Time  Test Comments  Text Results  Atomic Results  
Result Comments









 CHEST SINGLE (PORTABLE)            [place] [address]      Patient Name: LISBETH PRATER   MR #: [id number]    : 1929 Age/Sex: 88/F  Acct #: 
[id number] Req #: [id number]  Adm Physician:     Ordered by: KARRI GRACIA 
NP  Report #: [id number]   Location: ER  Room/Bed:     __________________________
_________________________________________________________________________    
Procedure: [id number] DX/CHEST SINGLE (PORTABLE)  Exam Date:                    
         Exam Time:        REPORT STATUS: Signed    PROCEDURE:   A single AP 
view of the chest.       COMPARISON: None.       INDICATIONS:   LEFT HIP 
INFECTION            FINDINGS:   Lines/tubes:  None.       Lungs:  Scalloping 
of the right hemidiaphragm. There is no evidence of    pneumonia or pulmonary 
edema. Mild biapical pleural-parenchymal    scarring left greater than right.  
     Pleura:  There is no pleural effusion or pneumothorax.       Heart and 
mediastinum:  The cardiac silhouette is mildly enlarged. The    thoracic aorta 
tortuous and unfolded with calcifications actually off    the arch. Mild 
prominence of the pulmonary vasculature bilaterally may    be related to 
portable technique.       Bones:  No acute bony abnormality.       IMPRESSION: 
       1.  Cardiomegaly. Possible mild pulmonary venous congestion without    
pulmonary edema.            HANNAH Dove M.D.     Dictated by:  HANNAH Dove M.D. on 2018 at 17:56        Electronically approved by
:  HANNAH Dove M.D. on 2018 at    17:56                Dictated 
By: HAWK DOVE MD, MD  Electronically Signed By: HAWK DOVE MD, MD on 1756  Transcribed By: AD on 18       COPY TO:   KARRI GRACIA NP           

 

 HIP LEFT 2-3 VW (+/- PELVIS)            [place] [address]      Patient Name: 
LISBETH PRATER   MR #: [id number]    : 1929 Age/Sex: 88/F  
Acct #: [id number] Req #: [id number]  Adm Physician:     Ordered by: KARRI GRACIA NP  Report #: [id number]   Location: ER  Room/Bed:     ________________
________________________________________________________________________________
___    Procedure: [id number] DX/HIP LEFT 2-3 VW (+/- PELVIS)  Exam Date:        
                     Exam Time:        REPORT STATUS: Signed    PROCEDURE:   
HIP LEFT 2-3 VW (+/- PELVIS)       COMPARISON:   None.       INDICATIONS:   HIP 
INFECTION        FINDINGS:      BONES:   Generalized osteopenia. Status post 
total left hip arthroplasty    with a long intramedullary stem and the femur. 
There is lucency about    the acetabular screws are partially the 2 most 
superiorly located. 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 
There is a 1.8 cm    sclerotic lesion in the distal femoral metadiaphysis with 
no suspicious    features.   SOFT TISSUES:   Negative.   OTHER:   
Osteoarthropathy bilaterally at L5-S1 and mild DJD of the SI    joints also 
observed.           CONCLUSION:       Status post total left hip arthroplasty 
with antonio-screw lucency in the    acetabular component which may represent 
hardware failure or infection    in the proper clinical setting.            HANNAH Dove M.D.     Dictated by:  HANNAH Dove M.D. on 2018 at 18:00        Electronically approved by:  HANNAH Dove M.D. on 
2018 at    18:00                Dictated By: HAWK DOVE MD, MD  
Electronically Signed By: HAWK DOVE MD, MD on 18 1800  Transcribed By
: AD on 18 1800       COPY TO:   KARRI GRACIA NP

## 2018-04-30 NOTE — DIAGNOSTIC IMAGING REPORT
PROCEDURE:HIP LEFT 2-3 VW (+/- PELVIS)

 

COMPARISON:None.

 

INDICATIONS:HIP INFECTION 

 

FINDINGS:

BONES:Generalized osteopenia. Status post total left hip arthroplasty 

with a long intramedullary stem and the femur. There is lucency about 

the acetabular screws are partially the 2 most superiorly located. Mild 

degenerative osteoporosis of the right hip joint. There is a 1.8 cm 

sclerotic lesion in the distal femoral metadiaphysis with no suspicious 

features.

SOFT TISSUES:Negative.

OTHER:Osteoarthropathy bilaterally at L5-S1 and mild DJD of the SI 

joints also observed.

 

 

CONCLUSION: 

Status post total left hip arthroplasty with antonio-screw lucency in the 

acetabular component which may represent hardware failure or infection 

in the proper clinical setting. 

 

 

HANNAH Page M.D.  

Dictated by:  HANNAH Page M.D. on 4/30/2018 at 18:00     

Electronically approved by:  HANNAH Page M.D. on 4/30/2018 at 

18:00

## 2018-05-01 VITALS — DIASTOLIC BLOOD PRESSURE: 76 MMHG | SYSTOLIC BLOOD PRESSURE: 141 MMHG

## 2018-05-01 VITALS — DIASTOLIC BLOOD PRESSURE: 53 MMHG | SYSTOLIC BLOOD PRESSURE: 93 MMHG

## 2018-05-01 VITALS — DIASTOLIC BLOOD PRESSURE: 48 MMHG | SYSTOLIC BLOOD PRESSURE: 81 MMHG

## 2018-05-01 VITALS — DIASTOLIC BLOOD PRESSURE: 46 MMHG | SYSTOLIC BLOOD PRESSURE: 89 MMHG

## 2018-05-01 VITALS — DIASTOLIC BLOOD PRESSURE: 57 MMHG | SYSTOLIC BLOOD PRESSURE: 104 MMHG

## 2018-05-01 VITALS — SYSTOLIC BLOOD PRESSURE: 92 MMHG | DIASTOLIC BLOOD PRESSURE: 49 MMHG

## 2018-05-01 VITALS — SYSTOLIC BLOOD PRESSURE: 92 MMHG | DIASTOLIC BLOOD PRESSURE: 48 MMHG

## 2018-05-01 VITALS — DIASTOLIC BLOOD PRESSURE: 51 MMHG | SYSTOLIC BLOOD PRESSURE: 82 MMHG

## 2018-05-01 VITALS — SYSTOLIC BLOOD PRESSURE: 93 MMHG | DIASTOLIC BLOOD PRESSURE: 49 MMHG

## 2018-05-01 VITALS — SYSTOLIC BLOOD PRESSURE: 107 MMHG | DIASTOLIC BLOOD PRESSURE: 51 MMHG

## 2018-05-01 VITALS — DIASTOLIC BLOOD PRESSURE: 50 MMHG | SYSTOLIC BLOOD PRESSURE: 73 MMHG

## 2018-05-01 VITALS — SYSTOLIC BLOOD PRESSURE: 104 MMHG | DIASTOLIC BLOOD PRESSURE: 54 MMHG

## 2018-05-01 VITALS — SYSTOLIC BLOOD PRESSURE: 88 MMHG | DIASTOLIC BLOOD PRESSURE: 52 MMHG

## 2018-05-01 VITALS — SYSTOLIC BLOOD PRESSURE: 90 MMHG | DIASTOLIC BLOOD PRESSURE: 56 MMHG

## 2018-05-01 VITALS — SYSTOLIC BLOOD PRESSURE: 70 MMHG | DIASTOLIC BLOOD PRESSURE: 46 MMHG

## 2018-05-01 VITALS — DIASTOLIC BLOOD PRESSURE: 51 MMHG | SYSTOLIC BLOOD PRESSURE: 86 MMHG

## 2018-05-01 VITALS — SYSTOLIC BLOOD PRESSURE: 89 MMHG | DIASTOLIC BLOOD PRESSURE: 56 MMHG

## 2018-05-01 VITALS — SYSTOLIC BLOOD PRESSURE: 88 MMHG | DIASTOLIC BLOOD PRESSURE: 47 MMHG

## 2018-05-01 VITALS — SYSTOLIC BLOOD PRESSURE: 94 MMHG | DIASTOLIC BLOOD PRESSURE: 53 MMHG

## 2018-05-01 VITALS — DIASTOLIC BLOOD PRESSURE: 47 MMHG | SYSTOLIC BLOOD PRESSURE: 85 MMHG

## 2018-05-01 VITALS — SYSTOLIC BLOOD PRESSURE: 95 MMHG | DIASTOLIC BLOOD PRESSURE: 52 MMHG

## 2018-05-01 VITALS — DIASTOLIC BLOOD PRESSURE: 44 MMHG | SYSTOLIC BLOOD PRESSURE: 86 MMHG

## 2018-05-01 VITALS — DIASTOLIC BLOOD PRESSURE: 53 MMHG | SYSTOLIC BLOOD PRESSURE: 86 MMHG

## 2018-05-01 VITALS — SYSTOLIC BLOOD PRESSURE: 83 MMHG | DIASTOLIC BLOOD PRESSURE: 49 MMHG

## 2018-05-01 VITALS — DIASTOLIC BLOOD PRESSURE: 47 MMHG | SYSTOLIC BLOOD PRESSURE: 95 MMHG

## 2018-05-01 VITALS — SYSTOLIC BLOOD PRESSURE: 82 MMHG | DIASTOLIC BLOOD PRESSURE: 43 MMHG

## 2018-05-01 LAB
ALBUMIN SERPL-MCNC: 2.6 G/DL (ref 3.5–5)
ALBUMIN/GLOB SERPL: 0.5 {RATIO} (ref 0.8–2)
ALP SERPL-CCNC: 68 IU/L (ref 40–150)
ALT SERPL-CCNC: 7 IU/L (ref 0–55)
ANION GAP SERPL CALC-SCNC: 11 MMOL/L (ref 8–16)
BASOPHILS # BLD AUTO: 0 10*3/UL (ref 0–0.1)
BASOPHILS NFR BLD AUTO: 0.5 % (ref 0–1)
BUN SERPL-MCNC: 18 MG/DL (ref 7–26)
BUN/CREAT SERPL: 20 (ref 6–25)
CALCIUM SERPL-MCNC: 9.1 MG/DL (ref 8.4–10.2)
CHLORIDE SERPL-SCNC: 109 MMOL/L (ref 98–107)
CO2 SERPL-SCNC: 28 MMOL/L (ref 22–29)
DEPRECATED NEUTROPHILS # BLD AUTO: 3.8 10*3/UL (ref 2.1–6.9)
EGFRCR SERPLBLD CKD-EPI 2021: 58 ML/MIN (ref 60–?)
EOSINOPHIL # BLD AUTO: 0.2 10*3/UL (ref 0–0.4)
EOSINOPHIL NFR BLD AUTO: 3.9 % (ref 0–6)
ERYTHROCYTE [DISTWIDTH] IN CORD BLOOD: 12.8 % (ref 11.7–14.4)
GLOBULIN PLAS-MCNC: 5.7 G/DL (ref 2.3–3.5)
GLUCOSE SERPLBLD-MCNC: 103 MG/DL (ref 74–118)
HCT VFR BLD AUTO: 29.2 % (ref 34.2–44.1)
HCT VFR BLD AUTO: 33.2 % (ref 34.2–44.1)
HGB BLD-MCNC: 11.2 G/DL (ref 12–16)
HGB BLD-MCNC: 9.4 G/DL (ref 12–16)
LYMPHOCYTES # BLD: 1.6 10*3/UL (ref 1–3.2)
LYMPHOCYTES NFR BLD AUTO: 25.2 % (ref 18–39.1)
MCH RBC QN AUTO: 28.3 PG (ref 28–32)
MCHC RBC AUTO-ENTMCNC: 32.2 G/DL (ref 31–35)
MCV RBC AUTO: 88 FL (ref 81–99)
MONOCYTES # BLD AUTO: 0.5 10*3/UL (ref 0.2–0.8)
MONOCYTES NFR BLD AUTO: 8.3 % (ref 4.4–11.3)
NEUTS SEG NFR BLD AUTO: 61.8 % (ref 38.7–80)
PLATELET # BLD AUTO: 209 X10E3/UL (ref 140–360)
POTASSIUM SERPL-SCNC: 4 MMOL/L (ref 3.5–5.1)
RBC # BLD AUTO: 3.32 X10E6/UL (ref 3.6–5.1)
SODIUM SERPL-SCNC: 144 MMOL/L (ref 136–145)

## 2018-05-01 PROCEDURE — B5181ZA FLUOROSCOPY OF SUPERIOR VENA CAVA USING LOW OSMOLAR CONTRAST, GUIDANCE: ICD-10-PCS

## 2018-05-01 PROCEDURE — 02HV33Z INSERTION OF INFUSION DEVICE INTO SUPERIOR VENA CAVA, PERCUTANEOUS APPROACH: ICD-10-PCS

## 2018-05-01 PROCEDURE — 0SPS0JZ REMOVAL OF SYNTHETIC SUBSTITUTE FROM LEFT HIP JOINT, FEMORAL SURFACE, OPEN APPROACH: ICD-10-PCS | Performed by: SPECIALIST

## 2018-05-01 PROCEDURE — 30243N1 TRANSFUSION OF NONAUTOLOGOUS RED BLOOD CELLS INTO CENTRAL VEIN, PERCUTANEOUS APPROACH: ICD-10-PCS | Performed by: SPECIALIST

## 2018-05-01 PROCEDURE — 0SRB0J9 REPLACEMENT OF LEFT HIP JOINT WITH SYNTHETIC SUBSTITUTE, CEMENTED, OPEN APPROACH: ICD-10-PCS | Performed by: SPECIALIST

## 2018-05-01 PROCEDURE — 0SPE0JZ REMOVAL OF SYNTHETIC SUBSTITUTE FROM LEFT HIP JOINT, ACETABULAR SURFACE, OPEN APPROACH: ICD-10-PCS | Performed by: SPECIALIST

## 2018-05-01 RX ADMIN — VANCOMYCIN HYDROCHLORIDE SCH MLS/HR: 1 INJECTION, SOLUTION INTRAVENOUS at 17:36

## 2018-05-01 RX ADMIN — SODIUM CHLORIDE SCH MLS/HR: 9 INJECTION, SOLUTION INTRAVENOUS at 17:36

## 2018-05-01 RX ADMIN — TAZOBACTAM SODIUM AND PIPERACILLIN SODIUM SCH MLS/HR: 375; 3 INJECTION, SOLUTION INTRAVENOUS at 17:36

## 2018-05-01 RX ADMIN — SODIUM CHLORIDE SCH MLS/HR: 9 INJECTION, SOLUTION INTRAVENOUS at 02:11

## 2018-05-01 RX ADMIN — TAZOBACTAM SODIUM AND PIPERACILLIN SODIUM SCH MLS/HR: 375; 3 INJECTION, SOLUTION INTRAVENOUS at 22:15

## 2018-05-01 RX ADMIN — SODIUM CHLORIDE SCH MLS/HR: 9 INJECTION, SOLUTION INTRAVENOUS at 09:30

## 2018-05-01 RX ADMIN — ACETAMINOPHEN SCH MG: 10 INJECTION, SOLUTION INTRAVENOUS at 18:00

## 2018-05-01 RX ADMIN — CELECOXIB SCH MG: 100 CAPSULE ORAL at 17:36

## 2018-05-01 RX ADMIN — SODIUM CHLORIDE SCH MLS/HR: 9 INJECTION, SOLUTION INTRAVENOUS at 05:15

## 2018-05-01 NOTE — DIAGNOSTIC IMAGING REPORT
PROCEDURE:PELVIS AP 1-2 VIEWS

TECHNIQUE:Supine AP pelvis

INDICATION:Post left hip surgery

COMPARISON:[facility], DX, HIP LEFT 2-3 VW (+/- PELVIS), 

4/30/2018, 18:15.

 

FINDINGS:

See conclusion.

 

 

CONCLUSION:

1. Interval left hip arthroplasty extraction and osteotomy, with 

placement of new radiopaque beads, femoral head prosthesis and proximal 

femoral wire. The intertrochanteric aspect of the femur has been 

removed.

2. The regional pelvis is intact.

 

 

 

Dictated by:  Tushar Vieira M.D. on 5/01/2018 at 14:30     

Electronically approved by:  Tushar Vieira M.D. on 5/01/2018 at 

14:30

## 2018-05-01 NOTE — DIAGNOSTIC IMAGING REPORT
PROCEDURE:CHEST SINGLE (PORTABLE)

TECHNIQUE:Portable AP chest

INDICATION:PICC placement

COMPARISON:None.

 

FINDINGS:

See conclusion.

 

 

CONCLUSION:

1. Right PICC terminating in the low SVC.

2. Fibronodular scarring in the upper lung zones. Lungs otherwise 

clear. No pleural effusions.

3. Upper limits of normal heart size for technique. Tortuous thoracic 

aorta with arch calcification.

4. Intact skeleton.

 

 

 

Dictated by:  Tushar Vieira M.D. on 5/01/2018 at 11:28     

Electronically approved by:  Tushar Vieira M.D. on 5/01/2018 at 

11:28

## 2018-05-02 VITALS — DIASTOLIC BLOOD PRESSURE: 63 MMHG | SYSTOLIC BLOOD PRESSURE: 108 MMHG

## 2018-05-02 VITALS — SYSTOLIC BLOOD PRESSURE: 104 MMHG | DIASTOLIC BLOOD PRESSURE: 49 MMHG

## 2018-05-02 VITALS — DIASTOLIC BLOOD PRESSURE: 60 MMHG | SYSTOLIC BLOOD PRESSURE: 105 MMHG

## 2018-05-02 VITALS — SYSTOLIC BLOOD PRESSURE: 118 MMHG | DIASTOLIC BLOOD PRESSURE: 69 MMHG

## 2018-05-02 VITALS — DIASTOLIC BLOOD PRESSURE: 50 MMHG | SYSTOLIC BLOOD PRESSURE: 90 MMHG

## 2018-05-02 VITALS — SYSTOLIC BLOOD PRESSURE: 100 MMHG | DIASTOLIC BLOOD PRESSURE: 41 MMHG

## 2018-05-02 VITALS — SYSTOLIC BLOOD PRESSURE: 129 MMHG | DIASTOLIC BLOOD PRESSURE: 56 MMHG

## 2018-05-02 VITALS — DIASTOLIC BLOOD PRESSURE: 46 MMHG | SYSTOLIC BLOOD PRESSURE: 108 MMHG

## 2018-05-02 VITALS — SYSTOLIC BLOOD PRESSURE: 105 MMHG | DIASTOLIC BLOOD PRESSURE: 56 MMHG

## 2018-05-02 VITALS — SYSTOLIC BLOOD PRESSURE: 122 MMHG | DIASTOLIC BLOOD PRESSURE: 58 MMHG

## 2018-05-02 VITALS — DIASTOLIC BLOOD PRESSURE: 69 MMHG | SYSTOLIC BLOOD PRESSURE: 127 MMHG

## 2018-05-02 VITALS — DIASTOLIC BLOOD PRESSURE: 42 MMHG | SYSTOLIC BLOOD PRESSURE: 95 MMHG

## 2018-05-02 VITALS — SYSTOLIC BLOOD PRESSURE: 132 MMHG | DIASTOLIC BLOOD PRESSURE: 116 MMHG

## 2018-05-02 VITALS — DIASTOLIC BLOOD PRESSURE: 55 MMHG | SYSTOLIC BLOOD PRESSURE: 117 MMHG

## 2018-05-02 VITALS — DIASTOLIC BLOOD PRESSURE: 46 MMHG | SYSTOLIC BLOOD PRESSURE: 97 MMHG

## 2018-05-02 VITALS — SYSTOLIC BLOOD PRESSURE: 99 MMHG | DIASTOLIC BLOOD PRESSURE: 47 MMHG

## 2018-05-02 VITALS — DIASTOLIC BLOOD PRESSURE: 61 MMHG | SYSTOLIC BLOOD PRESSURE: 120 MMHG

## 2018-05-02 VITALS — SYSTOLIC BLOOD PRESSURE: 110 MMHG | DIASTOLIC BLOOD PRESSURE: 49 MMHG

## 2018-05-02 VITALS — SYSTOLIC BLOOD PRESSURE: 121 MMHG | DIASTOLIC BLOOD PRESSURE: 56 MMHG

## 2018-05-02 VITALS — SYSTOLIC BLOOD PRESSURE: 100 MMHG | DIASTOLIC BLOOD PRESSURE: 61 MMHG

## 2018-05-02 VITALS — SYSTOLIC BLOOD PRESSURE: 111 MMHG | DIASTOLIC BLOOD PRESSURE: 48 MMHG

## 2018-05-02 VITALS — SYSTOLIC BLOOD PRESSURE: 88 MMHG | DIASTOLIC BLOOD PRESSURE: 41 MMHG

## 2018-05-02 VITALS — SYSTOLIC BLOOD PRESSURE: 89 MMHG | DIASTOLIC BLOOD PRESSURE: 45 MMHG

## 2018-05-02 VITALS — DIASTOLIC BLOOD PRESSURE: 45 MMHG | SYSTOLIC BLOOD PRESSURE: 99 MMHG

## 2018-05-02 VITALS — DIASTOLIC BLOOD PRESSURE: 52 MMHG | SYSTOLIC BLOOD PRESSURE: 99 MMHG

## 2018-05-02 VITALS — DIASTOLIC BLOOD PRESSURE: 58 MMHG | SYSTOLIC BLOOD PRESSURE: 122 MMHG

## 2018-05-02 VITALS — DIASTOLIC BLOOD PRESSURE: 70 MMHG | SYSTOLIC BLOOD PRESSURE: 121 MMHG

## 2018-05-02 VITALS — SYSTOLIC BLOOD PRESSURE: 96 MMHG | DIASTOLIC BLOOD PRESSURE: 47 MMHG

## 2018-05-02 VITALS — SYSTOLIC BLOOD PRESSURE: 107 MMHG | DIASTOLIC BLOOD PRESSURE: 48 MMHG

## 2018-05-02 VITALS — SYSTOLIC BLOOD PRESSURE: 120 MMHG | DIASTOLIC BLOOD PRESSURE: 43 MMHG

## 2018-05-02 VITALS — DIASTOLIC BLOOD PRESSURE: 53 MMHG | SYSTOLIC BLOOD PRESSURE: 109 MMHG

## 2018-05-02 VITALS — DIASTOLIC BLOOD PRESSURE: 50 MMHG | SYSTOLIC BLOOD PRESSURE: 106 MMHG

## 2018-05-02 VITALS — SYSTOLIC BLOOD PRESSURE: 111 MMHG | DIASTOLIC BLOOD PRESSURE: 82 MMHG

## 2018-05-02 VITALS — DIASTOLIC BLOOD PRESSURE: 48 MMHG | SYSTOLIC BLOOD PRESSURE: 97 MMHG

## 2018-05-02 VITALS — DIASTOLIC BLOOD PRESSURE: 53 MMHG | SYSTOLIC BLOOD PRESSURE: 106 MMHG

## 2018-05-02 VITALS — DIASTOLIC BLOOD PRESSURE: 55 MMHG | SYSTOLIC BLOOD PRESSURE: 121 MMHG

## 2018-05-02 VITALS — SYSTOLIC BLOOD PRESSURE: 116 MMHG | DIASTOLIC BLOOD PRESSURE: 99 MMHG

## 2018-05-02 VITALS — SYSTOLIC BLOOD PRESSURE: 98 MMHG | DIASTOLIC BLOOD PRESSURE: 66 MMHG

## 2018-05-02 VITALS — SYSTOLIC BLOOD PRESSURE: 122 MMHG | DIASTOLIC BLOOD PRESSURE: 70 MMHG

## 2018-05-02 VITALS — DIASTOLIC BLOOD PRESSURE: 49 MMHG | SYSTOLIC BLOOD PRESSURE: 91 MMHG

## 2018-05-02 VITALS — SYSTOLIC BLOOD PRESSURE: 127 MMHG | DIASTOLIC BLOOD PRESSURE: 70 MMHG

## 2018-05-02 VITALS — SYSTOLIC BLOOD PRESSURE: 115 MMHG | DIASTOLIC BLOOD PRESSURE: 80 MMHG

## 2018-05-02 VITALS — SYSTOLIC BLOOD PRESSURE: 95 MMHG | DIASTOLIC BLOOD PRESSURE: 47 MMHG

## 2018-05-02 VITALS — SYSTOLIC BLOOD PRESSURE: 128 MMHG | DIASTOLIC BLOOD PRESSURE: 48 MMHG

## 2018-05-02 VITALS — SYSTOLIC BLOOD PRESSURE: 92 MMHG | DIASTOLIC BLOOD PRESSURE: 47 MMHG

## 2018-05-02 VITALS — DIASTOLIC BLOOD PRESSURE: 53 MMHG | SYSTOLIC BLOOD PRESSURE: 116 MMHG

## 2018-05-02 VITALS — SYSTOLIC BLOOD PRESSURE: 94 MMHG | DIASTOLIC BLOOD PRESSURE: 50 MMHG

## 2018-05-02 VITALS — SYSTOLIC BLOOD PRESSURE: 112 MMHG | DIASTOLIC BLOOD PRESSURE: 65 MMHG

## 2018-05-02 VITALS — SYSTOLIC BLOOD PRESSURE: 86 MMHG | DIASTOLIC BLOOD PRESSURE: 40 MMHG

## 2018-05-02 VITALS — DIASTOLIC BLOOD PRESSURE: 50 MMHG | SYSTOLIC BLOOD PRESSURE: 109 MMHG

## 2018-05-02 VITALS — DIASTOLIC BLOOD PRESSURE: 44 MMHG | SYSTOLIC BLOOD PRESSURE: 82 MMHG

## 2018-05-02 VITALS — SYSTOLIC BLOOD PRESSURE: 90 MMHG | DIASTOLIC BLOOD PRESSURE: 62 MMHG

## 2018-05-02 VITALS — DIASTOLIC BLOOD PRESSURE: 44 MMHG | SYSTOLIC BLOOD PRESSURE: 98 MMHG

## 2018-05-02 VITALS — DIASTOLIC BLOOD PRESSURE: 56 MMHG | SYSTOLIC BLOOD PRESSURE: 107 MMHG

## 2018-05-02 VITALS — DIASTOLIC BLOOD PRESSURE: 61 MMHG | SYSTOLIC BLOOD PRESSURE: 130 MMHG

## 2018-05-02 VITALS — DIASTOLIC BLOOD PRESSURE: 64 MMHG | SYSTOLIC BLOOD PRESSURE: 110 MMHG

## 2018-05-02 VITALS — DIASTOLIC BLOOD PRESSURE: 68 MMHG | SYSTOLIC BLOOD PRESSURE: 123 MMHG

## 2018-05-02 VITALS — SYSTOLIC BLOOD PRESSURE: 111 MMHG | DIASTOLIC BLOOD PRESSURE: 49 MMHG

## 2018-05-02 VITALS — DIASTOLIC BLOOD PRESSURE: 46 MMHG | SYSTOLIC BLOOD PRESSURE: 94 MMHG

## 2018-05-02 VITALS — DIASTOLIC BLOOD PRESSURE: 65 MMHG | SYSTOLIC BLOOD PRESSURE: 113 MMHG

## 2018-05-02 VITALS — DIASTOLIC BLOOD PRESSURE: 44 MMHG | SYSTOLIC BLOOD PRESSURE: 93 MMHG

## 2018-05-02 VITALS — SYSTOLIC BLOOD PRESSURE: 104 MMHG | DIASTOLIC BLOOD PRESSURE: 46 MMHG

## 2018-05-02 VITALS — SYSTOLIC BLOOD PRESSURE: 109 MMHG | DIASTOLIC BLOOD PRESSURE: 49 MMHG

## 2018-05-02 VITALS — SYSTOLIC BLOOD PRESSURE: 87 MMHG | DIASTOLIC BLOOD PRESSURE: 46 MMHG

## 2018-05-02 VITALS — SYSTOLIC BLOOD PRESSURE: 103 MMHG | DIASTOLIC BLOOD PRESSURE: 46 MMHG

## 2018-05-02 VITALS — SYSTOLIC BLOOD PRESSURE: 113 MMHG | DIASTOLIC BLOOD PRESSURE: 53 MMHG

## 2018-05-02 VITALS — SYSTOLIC BLOOD PRESSURE: 112 MMHG | DIASTOLIC BLOOD PRESSURE: 52 MMHG

## 2018-05-02 LAB
HCT VFR BLD AUTO: 27.1 % (ref 34.2–44.1)
HGB BLD-MCNC: 9.5 G/DL (ref 12–16)

## 2018-05-02 RX ADMIN — VANCOMYCIN HYDROCHLORIDE SCH MLS/HR: 1 INJECTION, SOLUTION INTRAVENOUS at 03:56

## 2018-05-02 RX ADMIN — SODIUM CHLORIDE SCH MLS/HR: 9 INJECTION, SOLUTION INTRAVENOUS at 21:24

## 2018-05-02 RX ADMIN — ACETAMINOPHEN SCH MG: 10 INJECTION, SOLUTION INTRAVENOUS at 14:25

## 2018-05-02 RX ADMIN — VANCOMYCIN HYDROCHLORIDE SCH MLS/HR: 1 INJECTION, SOLUTION INTRAVENOUS at 15:30

## 2018-05-02 RX ADMIN — SODIUM CHLORIDE SCH MLS/HR: 9 INJECTION, SOLUTION INTRAVENOUS at 11:30

## 2018-05-02 RX ADMIN — ACETAMINOPHEN SCH MG: 10 INJECTION, SOLUTION INTRAVENOUS at 01:25

## 2018-05-02 RX ADMIN — CELECOXIB SCH MG: 100 CAPSULE ORAL at 08:56

## 2018-05-02 RX ADMIN — ACETAMINOPHEN SCH MG: 10 INJECTION, SOLUTION INTRAVENOUS at 05:56

## 2018-05-02 RX ADMIN — TAZOBACTAM SODIUM AND PIPERACILLIN SODIUM SCH MLS/HR: 375; 3 INJECTION, SOLUTION INTRAVENOUS at 05:15

## 2018-05-02 RX ADMIN — TAZOBACTAM SODIUM AND PIPERACILLIN SODIUM SCH MLS/HR: 375; 3 INJECTION, SOLUTION INTRAVENOUS at 14:40

## 2018-05-02 RX ADMIN — CELECOXIB SCH MG: 100 CAPSULE ORAL at 16:44

## 2018-05-02 NOTE — CONSULTATION
DATE OF CONSULTATION:    _______





HISTORY OF PRESENT ILLNESS:  This patient who is a very pleasant 

88-year-old female who underwent a left total hip prosthesis surgery ORIF 

of the greater trochanter October 4, 2016. She did well.  She apparently 

started to come back recently to see her orthopedic, Dr. Cadet, for 

recurrent hip pain.  The patient apparently since January the hip pain is 

getting progressively worse.  Patient her had telemetry panel which showed 

that she has an acute process so she was taken to the OR for aspiration 

under fluoroscopy.  The patient underwent the procedure on April 12th under 

fluoroscopy.  Apparently it was felt that she has infection so patient is 

being admitted for removal of infected hip.  Patient underwent the 

procedure today.  She is doing much better.  She looks comfortable.  

Infectious Disease was consulted.  Patient is currently lying in bed 

comfortably.



PAST MEDICAL HISTORY:  Arthritis, hypertension.



PAST SURGICAL HISTORY:  Appendectomy, bladder suspension, cholecystectomy.



ALLERGIES:  NKA.



SOCIAL HISTORY:  There is no smoking, drug abuse or alcohol abuse.



FAMILY HISTORY:  Noncontributory.



REVIEW OF SYSTEMS

GENERAL:  At the present time she is feeling fair.  There is no fever, no 

chills.

HEENT:  Negative. 

PULMONARY:  Negative. 

CARDIAC:  Negative. 

:  Negative.

SKIN:  There is no rash.

JOINTS:  There is no acute worsening of pain, redness or swelling.



The patient had the surgery and Hemovac is noted with bloody material, 

looked good though.



LABORATORY DATA:  Reviewed.  ________ total protein.  Her sed rate was 130, 

_______ 55.  She was growing Staphylococcus aureus although I do not have 

the original culture from back on April 12th.



PHYSICAL EXAMINATION

GENERAL:  She is currently alert, oriented, does not seem in acute 

distress. 

VITALS:  Stable, currently afebrile. 

HEENT:  She is not icteric. 

NECK:  Supple. 

CHEST:  Clear. 

HEART:  S1 and S2.  No S3, S4 or murmur.   

ABDOMEN:  Soft.  Bowel sounds present.  No tenderness.

EXTREMITIES:  No edema.

SKIN:  There is no rash. 



IMPRESSION:  Infection of the hip status post removal.  Would recommend 8 

weeks of IV antibiotic.  Continue with vancomycin.  Await culture 

sensitivity.  Will get a PICC line.  Will 

discuss with family about outpatient IV antibiotic.  Will follow with you.  

Thank you for asking us to see this patient.



 



DD:  05/02/2018 15:16

DT:  05/02/2018 15:55

Job#:  [id number]

## 2018-05-03 VITALS — DIASTOLIC BLOOD PRESSURE: 60 MMHG | SYSTOLIC BLOOD PRESSURE: 129 MMHG

## 2018-05-03 VITALS — SYSTOLIC BLOOD PRESSURE: 146 MMHG | DIASTOLIC BLOOD PRESSURE: 62 MMHG

## 2018-05-03 VITALS — SYSTOLIC BLOOD PRESSURE: 123 MMHG | DIASTOLIC BLOOD PRESSURE: 57 MMHG

## 2018-05-03 VITALS — SYSTOLIC BLOOD PRESSURE: 137 MMHG | DIASTOLIC BLOOD PRESSURE: 89 MMHG

## 2018-05-03 VITALS — DIASTOLIC BLOOD PRESSURE: 59 MMHG | SYSTOLIC BLOOD PRESSURE: 127 MMHG

## 2018-05-03 VITALS — DIASTOLIC BLOOD PRESSURE: 66 MMHG | SYSTOLIC BLOOD PRESSURE: 128 MMHG

## 2018-05-03 VITALS — DIASTOLIC BLOOD PRESSURE: 62 MMHG | SYSTOLIC BLOOD PRESSURE: 146 MMHG

## 2018-05-03 LAB
HCT VFR BLD AUTO: 23.6 % (ref 34.2–44.1)
HGB BLD-MCNC: 8.1 G/DL (ref 12–16)

## 2018-05-03 RX ADMIN — HYDROCODONE BITARTRATE AND ACETAMINOPHEN PRN EA: 7.5; 325 TABLET ORAL at 05:35

## 2018-05-03 RX ADMIN — VANCOMYCIN HYDROCHLORIDE SCH MLS/HR: 1 INJECTION, SOLUTION INTRAVENOUS at 16:45

## 2018-05-03 RX ADMIN — VANCOMYCIN HYDROCHLORIDE SCH MLS/HR: 1 INJECTION, SOLUTION INTRAVENOUS at 15:30

## 2018-05-03 RX ADMIN — CELECOXIB SCH MG: 100 CAPSULE ORAL at 17:07

## 2018-05-03 RX ADMIN — SODIUM CHLORIDE SCH MLS/HR: 9 INJECTION, SOLUTION INTRAVENOUS at 05:40

## 2018-05-03 RX ADMIN — OXYBUTYNIN CHLORIDE SCH MG: 5 TABLET ORAL at 23:53

## 2018-05-03 RX ADMIN — VANCOMYCIN HYDROCHLORIDE SCH MLS/HR: 1 INJECTION, SOLUTION INTRAVENOUS at 03:30

## 2018-05-03 RX ADMIN — HYDROCODONE BITARTRATE AND ACETAMINOPHEN PRN EA: 7.5; 325 TABLET ORAL at 14:00

## 2018-05-03 RX ADMIN — CELECOXIB SCH MG: 100 CAPSULE ORAL at 09:09

## 2018-05-03 NOTE — OPERATIVE REPORT
DATE OF PROCEDURE:  May 01, 2018 



ASSISTANT:   Manuel Espinoza PA-C



The patient was brought to the operating room for induction of anesthesia.  

Throughout this case, my PA's assistance was necessary for retraction of 

soft tissue and positioning of the extremity.  This allows for efficient 

and technically successful execution of the operation and is considered 

medically necessary.



PREOPERATIVE DIAGNOSIS:  Infection of internal prosthesis left hip.



POSTOPERATIVE DIAGNOSIS:  Infection of internal prosthesis left hip.



PROCEDURE:  Left hip extensive sharp irrigation and debridement, removal of 

prosthesis and placement of antibiotic spacer.



INDICATIONS:  The patient is an 88-year-old lady who underwent a revision 

of her left hip about 15 months ago.  About 4 or 5 months ago, she started 

to have some pain in her hip.  This progressed, and she was noted to have 

an elevated series of inflammatory blood work.  She was taken for an 

aspiration.  This cultured positive for MRSA.  She presented to my clinic 

yesterday.  She was noted to have an impending abscess of the lower portion 

of the incision.  She was emergently admitted and scheduled for urgent 

surgical intervention.  The risks and benefits and challenges for someone 

go through this at the age of 88 have been explained.  Both she and her 

daughter state they understand and wish to proceed.



DESCRIPTION OF PROCEDURE:  The patient was brought to the operating room 

and placed under general anesthetic.  She was positioned in the right 

lateral decubitus position.  Her left hip was prepped and draped in a 

sterile manner.  A preoperative time out was performed.  The previous 

incision was utilized.  The large area of inflammation was ellipsed out 

including the skin.  A deep infection was encountered.  A large amount of 

inflammatory tissue was sharply excised.  Hemostasis was obtained along the 

way with electrocautery, but there was persistent and quite diffuse 

bruising of the tissue.  Cultures were sent but, as mentioned, we had 

previously cultured positive for MRSA.  The prosthesis was carefully 

exposed.  Further excision of inflammatory tissue was performed up around 

the socket.  The previous modular stem was disassembled.  The head was 

removed, and then the proximal body was removed.  A thin osteotome was used 

to free up the stem into the femoral canal.  This was removed with a slap 

hammer.  No additional bone destruction was encountered.  The canal was 

carefully cleaned with a large curved curet.  A portion of the greater 

trochanter was necrotic and infected.  This was carefully excised.  

Attention was then directed towards the socket.  Acetabular retractors were 

placed.  A large amount of inflammatory tissue was removed.  The acetabular 

liner was removed with small quarter-inch osteotomes.  The interlocking 

screws were removed, and then an Explant system was used to remove the 

socket.  The socket was thoroughly cleared of fibrinous membrane using a 

large curved curet.  No additional bone destruction was felt to be 

encountered. The hip was thoroughly irrigated with copious amounts of 

sterile saline.  Two mixes of Simplex cement pre-loaded with tobramycin 

were prepared on the back table.  An additional 6 grams of vancomycin and 4 

grams of tobramycin were included into the cement mixture.  An additional 

polymer was used to cure the cement.  I did not feel that any temporary 

artificial prosthesis would be successful.  The risks for dislocation would 

be unacceptably high.  I, therefore, created a static spacer with a 

Steinmann pin coated in antibiotic cement to go into the femoral canal, 

large antibiotic beads to go into the soft-tissue defect, and a large ball 

of cement to go into the acetabular defect.  The wound was further 

irrigated while the cement cured.  The incision was carefully closed with 

deep Prolene stitches.  No Ethibond or other type of stitches were left 

into the wound.  All of the previous Ethibond stitches were carefully 

removed.  The wound was closed over a deep medium Hemovac drain.  Estimated 

blood loss was approximately 1 liter.  At the end of the procedure, all 

needle and sponge counts were correct.  The patient was transported to the 

recovery room in stable condition. 

  





DD:  05/03/2018 12:04

DT:  05/03/2018 14:06

Job#:  [id number]

## 2018-05-04 VITALS — SYSTOLIC BLOOD PRESSURE: 161 MMHG | DIASTOLIC BLOOD PRESSURE: 87 MMHG

## 2018-05-04 VITALS — SYSTOLIC BLOOD PRESSURE: 151 MMHG | DIASTOLIC BLOOD PRESSURE: 70 MMHG

## 2018-05-04 VITALS — DIASTOLIC BLOOD PRESSURE: 63 MMHG | SYSTOLIC BLOOD PRESSURE: 138 MMHG

## 2018-05-04 VITALS — DIASTOLIC BLOOD PRESSURE: 61 MMHG | SYSTOLIC BLOOD PRESSURE: 125 MMHG

## 2018-05-04 VITALS — SYSTOLIC BLOOD PRESSURE: 139 MMHG | DIASTOLIC BLOOD PRESSURE: 65 MMHG

## 2018-05-04 VITALS — SYSTOLIC BLOOD PRESSURE: 157 MMHG | DIASTOLIC BLOOD PRESSURE: 66 MMHG

## 2018-05-04 LAB
ALBUMIN SERPL-MCNC: 1.8 G/DL (ref 3.5–5)
ALBUMIN/GLOB SERPL: 0.5 {RATIO} (ref 0.8–2)
ALP SERPL-CCNC: 44 IU/L (ref 40–150)
ALT SERPL-CCNC: 7 IU/L (ref 0–55)
ANION GAP SERPL CALC-SCNC: 6.5 MMOL/L (ref 8–16)
BASOPHILS # BLD AUTO: 0 10*3/UL (ref 0–0.1)
BASOPHILS NFR BLD AUTO: 0.5 % (ref 0–1)
BUN SERPL-MCNC: 12 MG/DL (ref 7–26)
BUN/CREAT SERPL: 16 (ref 6–25)
CALCIUM SERPL-MCNC: 7.8 MG/DL (ref 8.4–10.2)
CHLORIDE SERPL-SCNC: 114 MMOL/L (ref 98–107)
CO2 SERPL-SCNC: 24 MMOL/L (ref 22–29)
DEPRECATED NEUTROPHILS # BLD AUTO: 5 10*3/UL (ref 2.1–6.9)
EGFRCR SERPLBLD CKD-EPI 2021: > 60 ML/MIN (ref 60–?)
EOSINOPHIL # BLD AUTO: 0.6 10*3/UL (ref 0–0.4)
EOSINOPHIL NFR BLD AUTO: 7.1 % (ref 0–6)
ERYTHROCYTE [DISTWIDTH] IN CORD BLOOD: 14.2 % (ref 11.7–14.4)
GLOBULIN PLAS-MCNC: 3.6 G/DL (ref 2.3–3.5)
GLUCOSE SERPLBLD-MCNC: 96 MG/DL (ref 74–118)
HCT VFR BLD AUTO: 23.7 % (ref 34.2–44.1)
HGB BLD-MCNC: 8 G/DL (ref 12–16)
LYMPHOCYTES # BLD: 1.8 10*3/UL (ref 1–3.2)
LYMPHOCYTES NFR BLD AUTO: 22.4 % (ref 18–39.1)
MCH RBC QN AUTO: 29.6 PG (ref 28–32)
MCHC RBC AUTO-ENTMCNC: 33.8 G/DL (ref 31–35)
MCV RBC AUTO: 87.8 FL (ref 81–99)
MONOCYTES # BLD AUTO: 0.5 10*3/UL (ref 0.2–0.8)
MONOCYTES NFR BLD AUTO: 5.9 % (ref 4.4–11.3)
NEUTS SEG NFR BLD AUTO: 63 % (ref 38.7–80)
PLATELET # BLD AUTO: 134 X10E3/UL (ref 140–360)
POTASSIUM SERPL-SCNC: 3.5 MMOL/L (ref 3.5–5.1)
RBC # BLD AUTO: 2.7 X10E6/UL (ref 3.6–5.1)
SODIUM SERPL-SCNC: 141 MMOL/L (ref 136–145)

## 2018-05-04 RX ADMIN — KETOROLAC TROMETHAMINE PRN MG: 30 INJECTION, SOLUTION INTRAMUSCULAR; INTRAVENOUS at 20:07

## 2018-05-04 RX ADMIN — ASPIRIN 81 MG CHEWABLE TABLET SCH MG: 81 TABLET CHEWABLE at 09:03

## 2018-05-04 RX ADMIN — CLOPIDOGREL BISULFATE SCH MG: 75 TABLET, FILM COATED ORAL at 09:03

## 2018-05-04 RX ADMIN — OXYBUTYNIN CHLORIDE SCH MG: 5 TABLET ORAL at 12:06

## 2018-05-04 RX ADMIN — OXYBUTYNIN CHLORIDE SCH MG: 5 TABLET ORAL at 07:27

## 2018-05-04 RX ADMIN — Medication SCH MG: at 17:43

## 2018-05-04 RX ADMIN — RIFAMPIN SCH MG: 300 CAPSULE ORAL at 09:04

## 2018-05-04 RX ADMIN — OXYBUTYNIN CHLORIDE SCH MG: 5 TABLET ORAL at 18:09

## 2018-05-04 RX ADMIN — VANCOMYCIN HYDROCHLORIDE SCH MLS/HR: 1 INJECTION, SOLUTION INTRAVENOUS at 17:43

## 2018-05-04 RX ADMIN — SODIUM CHLORIDE SCH MLS/HR: 9 INJECTION, SOLUTION INTRAVENOUS at 01:40

## 2018-05-04 RX ADMIN — CELECOXIB SCH MG: 100 CAPSULE ORAL at 09:03

## 2018-05-04 RX ADMIN — SODIUM CHLORIDE SCH MLS/HR: 9 INJECTION, SOLUTION INTRAVENOUS at 07:27

## 2018-05-04 RX ADMIN — SODIUM CHLORIDE SCH MLS/HR: 9 INJECTION, SOLUTION INTRAVENOUS at 22:05

## 2018-05-05 VITALS — SYSTOLIC BLOOD PRESSURE: 174 MMHG | DIASTOLIC BLOOD PRESSURE: 80 MMHG

## 2018-05-05 VITALS — DIASTOLIC BLOOD PRESSURE: 74 MMHG | SYSTOLIC BLOOD PRESSURE: 160 MMHG

## 2018-05-05 VITALS — DIASTOLIC BLOOD PRESSURE: 75 MMHG | SYSTOLIC BLOOD PRESSURE: 165 MMHG

## 2018-05-05 VITALS — SYSTOLIC BLOOD PRESSURE: 139 MMHG | DIASTOLIC BLOOD PRESSURE: 65 MMHG

## 2018-05-05 VITALS — SYSTOLIC BLOOD PRESSURE: 154 MMHG | DIASTOLIC BLOOD PRESSURE: 85 MMHG

## 2018-05-05 VITALS — SYSTOLIC BLOOD PRESSURE: 135 MMHG | DIASTOLIC BLOOD PRESSURE: 63 MMHG

## 2018-05-05 VITALS — SYSTOLIC BLOOD PRESSURE: 171 MMHG | DIASTOLIC BLOOD PRESSURE: 78 MMHG

## 2018-05-05 RX ADMIN — Medication SCH MG: at 09:29

## 2018-05-05 RX ADMIN — KETOROLAC TROMETHAMINE PRN MG: 30 INJECTION, SOLUTION INTRAMUSCULAR; INTRAVENOUS at 13:20

## 2018-05-05 RX ADMIN — OXYBUTYNIN CHLORIDE SCH MG: 5 TABLET ORAL at 06:33

## 2018-05-05 RX ADMIN — OXYBUTYNIN CHLORIDE SCH MG: 5 TABLET ORAL at 17:46

## 2018-05-05 RX ADMIN — ASPIRIN 81 MG CHEWABLE TABLET SCH MG: 81 TABLET CHEWABLE at 09:29

## 2018-05-05 RX ADMIN — OXYBUTYNIN CHLORIDE SCH MG: 5 TABLET ORAL at 12:18

## 2018-05-05 RX ADMIN — OXYBUTYNIN CHLORIDE SCH MG: 5 TABLET ORAL at 00:43

## 2018-05-05 RX ADMIN — Medication SCH MG: at 17:21

## 2018-05-05 RX ADMIN — VANCOMYCIN HYDROCHLORIDE SCH MLS/HR: 1 INJECTION, SOLUTION INTRAVENOUS at 17:21

## 2018-05-05 RX ADMIN — SODIUM CHLORIDE SCH MLS/HR: 9 INJECTION, SOLUTION INTRAVENOUS at 11:30

## 2018-05-05 RX ADMIN — SODIUM CHLORIDE SCH MLS/HR: 9 INJECTION, SOLUTION INTRAVENOUS at 17:40

## 2018-05-05 RX ADMIN — CLOPIDOGREL BISULFATE SCH MG: 75 TABLET, FILM COATED ORAL at 09:29

## 2018-05-05 RX ADMIN — RIFAMPIN SCH MG: 300 CAPSULE ORAL at 09:29

## 2018-05-06 VITALS — SYSTOLIC BLOOD PRESSURE: 153 MMHG | DIASTOLIC BLOOD PRESSURE: 74 MMHG

## 2018-05-06 VITALS — SYSTOLIC BLOOD PRESSURE: 142 MMHG | DIASTOLIC BLOOD PRESSURE: 69 MMHG

## 2018-05-06 VITALS — SYSTOLIC BLOOD PRESSURE: 173 MMHG | DIASTOLIC BLOOD PRESSURE: 74 MMHG

## 2018-05-06 VITALS — DIASTOLIC BLOOD PRESSURE: 77 MMHG | SYSTOLIC BLOOD PRESSURE: 163 MMHG

## 2018-05-06 VITALS — SYSTOLIC BLOOD PRESSURE: 163 MMHG | DIASTOLIC BLOOD PRESSURE: 77 MMHG

## 2018-05-06 VITALS — SYSTOLIC BLOOD PRESSURE: 148 MMHG | DIASTOLIC BLOOD PRESSURE: 74 MMHG

## 2018-05-06 VITALS — SYSTOLIC BLOOD PRESSURE: 151 MMHG | DIASTOLIC BLOOD PRESSURE: 70 MMHG

## 2018-05-06 LAB
BASOPHILS # BLD AUTO: 0 10*3/UL (ref 0–0.1)
BASOPHILS NFR BLD AUTO: 0.3 % (ref 0–1)
DEPRECATED NEUTROPHILS # BLD AUTO: 3.9 10*3/UL (ref 2.1–6.9)
EOSINOPHIL # BLD AUTO: 0.6 10*3/UL (ref 0–0.4)
EOSINOPHIL NFR BLD AUTO: 9 % (ref 0–6)
ERYTHROCYTE [DISTWIDTH] IN CORD BLOOD: 13.6 % (ref 11.7–14.4)
HCT VFR BLD AUTO: 24.3 % (ref 34.2–44.1)
HGB BLD-MCNC: 8.1 G/DL (ref 12–16)
LYMPHOCYTES # BLD: 1.4 10*3/UL (ref 1–3.2)
LYMPHOCYTES NFR BLD AUTO: 22.3 % (ref 18–39.1)
MCH RBC QN AUTO: 29.1 PG (ref 28–32)
MCHC RBC AUTO-ENTMCNC: 33.3 G/DL (ref 31–35)
MCV RBC AUTO: 87.4 FL (ref 81–99)
MONOCYTES # BLD AUTO: 0.4 10*3/UL (ref 0.2–0.8)
MONOCYTES NFR BLD AUTO: 6.9 % (ref 4.4–11.3)
NEUTS SEG NFR BLD AUTO: 60.7 % (ref 38.7–80)
PLATELET # BLD AUTO: 132 X10E3/UL (ref 140–360)
RBC # BLD AUTO: 2.78 X10E6/UL (ref 3.6–5.1)

## 2018-05-06 RX ADMIN — VANCOMYCIN HYDROCHLORIDE SCH MLS/HR: 1 INJECTION, SOLUTION INTRAVENOUS at 17:07

## 2018-05-06 RX ADMIN — Medication SCH MG: at 09:09

## 2018-05-06 RX ADMIN — CLOPIDOGREL BISULFATE SCH MG: 75 TABLET, FILM COATED ORAL at 09:09

## 2018-05-06 RX ADMIN — ASPIRIN 81 MG CHEWABLE TABLET SCH MG: 81 TABLET CHEWABLE at 09:09

## 2018-05-06 RX ADMIN — OXYBUTYNIN CHLORIDE SCH MG: 5 TABLET ORAL at 18:00

## 2018-05-06 RX ADMIN — KETOROLAC TROMETHAMINE PRN MG: 30 INJECTION, SOLUTION INTRAMUSCULAR; INTRAVENOUS at 09:00

## 2018-05-06 RX ADMIN — OXYBUTYNIN CHLORIDE SCH MG: 5 TABLET ORAL at 00:15

## 2018-05-06 RX ADMIN — SODIUM CHLORIDE SCH MLS/HR: 9 INJECTION, SOLUTION INTRAVENOUS at 23:40

## 2018-05-06 RX ADMIN — OXYBUTYNIN CHLORIDE SCH MG: 5 TABLET ORAL at 05:35

## 2018-05-06 RX ADMIN — SODIUM CHLORIDE SCH MLS/HR: 9 INJECTION, SOLUTION INTRAVENOUS at 16:50

## 2018-05-06 RX ADMIN — Medication SCH MG: at 17:07

## 2018-05-06 RX ADMIN — SODIUM CHLORIDE SCH MLS/HR: 9 INJECTION, SOLUTION INTRAVENOUS at 03:54

## 2018-05-06 RX ADMIN — OXYBUTYNIN CHLORIDE SCH MG: 5 TABLET ORAL at 12:00

## 2018-05-06 RX ADMIN — RIFAMPIN SCH MG: 300 CAPSULE ORAL at 09:09

## 2018-05-06 NOTE — PROGRESS NOTE
DATE:    



SUBJECTIVE:  Patient did well overnight.  Has occasional pain, but 

otherwise no new complaints.



OBJECTIVE:

VITAL SIGNS:  Stable.  She is afebrile.

GENERAL:  In no apparent distress.

LUNGS:  Clear to auscultation bilaterally.

CARDIOVASCULAR:  Regular rate and rhythm.

ABDOMEN:  Good bowel sounds, soft, nontender.

EXTREMITIES:  No clubbing or cyanosis.



ASSESSMENT AND PLAN:

1. Status post left hip arthroplasty resection __________ secondary to 

infection.  Continue with antibiotics.

2. Hypertension.  Continue with current care and monitoring.

3. Anemia.  Will check a complete blood cell count.

4. Urinary tract infection.  Will continue with Flagyl.



Please see hospital chart for full details.





DD:  05/06/2018 00:07

DT:  05/06/2018 01:02

Job#:  [id number]

## 2018-05-07 VITALS — DIASTOLIC BLOOD PRESSURE: 71 MMHG | SYSTOLIC BLOOD PRESSURE: 141 MMHG

## 2018-05-07 VITALS — SYSTOLIC BLOOD PRESSURE: 166 MMHG | DIASTOLIC BLOOD PRESSURE: 86 MMHG

## 2018-05-07 VITALS — DIASTOLIC BLOOD PRESSURE: 81 MMHG | SYSTOLIC BLOOD PRESSURE: 163 MMHG

## 2018-05-07 VITALS — SYSTOLIC BLOOD PRESSURE: 146 MMHG | DIASTOLIC BLOOD PRESSURE: 76 MMHG

## 2018-05-07 RX ADMIN — Medication SCH MG: at 08:43

## 2018-05-07 RX ADMIN — CLOPIDOGREL BISULFATE SCH MG: 75 TABLET, FILM COATED ORAL at 08:43

## 2018-05-07 RX ADMIN — ASPIRIN 81 MG CHEWABLE TABLET SCH MG: 81 TABLET CHEWABLE at 08:43

## 2018-05-07 RX ADMIN — RIFAMPIN SCH MG: 300 CAPSULE ORAL at 09:00

## 2018-05-07 RX ADMIN — OXYBUTYNIN CHLORIDE SCH MG: 5 TABLET ORAL at 00:15

## 2018-05-07 RX ADMIN — OXYBUTYNIN CHLORIDE SCH MG: 5 TABLET ORAL at 05:11

## 2018-06-12 ENCOUNTER — HOSPITAL ENCOUNTER (EMERGENCY)
Dept: HOSPITAL 88 - ER | Age: 83
Discharge: HOME | End: 2018-06-12
Payer: MEDICARE

## 2018-06-12 VITALS — HEIGHT: 58 IN | BODY MASS INDEX: 23.51 KG/M2 | WEIGHT: 112 LBS

## 2018-06-12 DIAGNOSIS — Z79.82: ICD-10-CM

## 2018-06-12 DIAGNOSIS — I10: ICD-10-CM

## 2018-06-12 DIAGNOSIS — Z79.02: ICD-10-CM

## 2018-06-12 DIAGNOSIS — D50.0: Primary | ICD-10-CM

## 2018-06-12 LAB
ALBUMIN SERPL-MCNC: 2.9 G/DL (ref 3.5–5)
ALBUMIN/GLOB SERPL: 0.6 {RATIO} (ref 0.8–2)
ALP SERPL-CCNC: 63 IU/L (ref 40–150)
ALT SERPL-CCNC: 10 IU/L (ref 0–55)
ANION GAP SERPL CALC-SCNC: 11 MMOL/L (ref 8–16)
BASOPHILS # BLD AUTO: 0 10*3/UL (ref 0–0.1)
BASOPHILS NFR BLD AUTO: 0.8 % (ref 0–1)
BUN SERPL-MCNC: 17 MG/DL (ref 7–26)
BUN/CREAT SERPL: 19 (ref 6–25)
CALCIUM SERPL-MCNC: 9 MG/DL (ref 8.4–10.2)
CHLORIDE SERPL-SCNC: 105 MMOL/L (ref 98–107)
CO2 SERPL-SCNC: 26 MMOL/L (ref 22–29)
DEPRECATED APTT PLAS QN: 38.4 SECONDS (ref 23.8–35.5)
DEPRECATED INR PLAS: 1.16
DEPRECATED NEUTROPHILS # BLD AUTO: 1.8 10*3/UL (ref 2.1–6.9)
EGFRCR SERPLBLD CKD-EPI 2021: > 60 ML/MIN (ref 60–?)
EOSINOPHIL # BLD AUTO: 0.4 10*3/UL (ref 0–0.4)
EOSINOPHIL NFR BLD AUTO: 10.6 % (ref 0–6)
ERYTHROCYTE [DISTWIDTH] IN CORD BLOOD: 13.7 % (ref 11.7–14.4)
GLOBULIN PLAS-MCNC: 4.5 G/DL (ref 2.3–3.5)
GLUCOSE SERPLBLD-MCNC: 97 MG/DL (ref 74–118)
HCT VFR BLD AUTO: 26.2 % (ref 34.2–44.1)
HGB BLD-MCNC: 8.7 G/DL (ref 12–16)
LYMPHOCYTES # BLD: 1.1 10*3/UL (ref 1–3.2)
LYMPHOCYTES NFR BLD AUTO: 29.8 % (ref 18–39.1)
MCH RBC QN AUTO: 29.9 PG (ref 28–32)
MCHC RBC AUTO-ENTMCNC: 33.2 G/DL (ref 31–35)
MCV RBC AUTO: 90 FL (ref 81–99)
MONOCYTES # BLD AUTO: 0.3 10*3/UL (ref 0.2–0.8)
MONOCYTES NFR BLD AUTO: 8.6 % (ref 4.4–11.3)
NEUTS SEG NFR BLD AUTO: 49.9 % (ref 38.7–80)
PLATELET # BLD AUTO: 169 X10E3/UL (ref 140–360)
POTASSIUM SERPL-SCNC: 4 MMOL/L (ref 3.5–5.1)
PROTHROMBIN TIME: 13.9 SECONDS (ref 11.9–14.5)
RBC # BLD AUTO: 2.91 X10E6/UL (ref 3.6–5.1)
SODIUM SERPL-SCNC: 138 MMOL/L (ref 136–145)

## 2018-06-12 PROCEDURE — 74176 CT ABD & PELVIS W/O CONTRAST: CPT

## 2018-06-12 PROCEDURE — 85730 THROMBOPLASTIN TIME PARTIAL: CPT

## 2018-06-12 PROCEDURE — 80053 COMPREHEN METABOLIC PANEL: CPT

## 2018-06-12 PROCEDURE — 86900 BLOOD TYPING SEROLOGIC ABO: CPT

## 2018-06-12 PROCEDURE — 85610 PROTHROMBIN TIME: CPT

## 2018-06-12 PROCEDURE — 99284 EMERGENCY DEPT VISIT MOD MDM: CPT

## 2018-06-12 PROCEDURE — 93005 ELECTROCARDIOGRAM TRACING: CPT

## 2018-06-12 PROCEDURE — 36415 COLL VENOUS BLD VENIPUNCTURE: CPT

## 2018-06-12 PROCEDURE — 82270 OCCULT BLOOD FECES: CPT

## 2018-06-12 PROCEDURE — 71045 X-RAY EXAM CHEST 1 VIEW: CPT

## 2018-06-12 PROCEDURE — 86850 RBC ANTIBODY SCREEN: CPT

## 2018-06-12 PROCEDURE — 85025 COMPLETE CBC W/AUTO DIFF WBC: CPT

## 2018-06-12 NOTE — DIAGNOSTIC IMAGING REPORT
PROCEDURE:

A single AP view of the chest.

 

COMPARISON: [facility], , CHEST SINGLE (PORTABLE), 

5/01/2018, 11:09.

 

INDICATIONS:   SHORTNESS OF BREATH

     

FINDINGS:

Lines/tubes: Stable right-sided PICC line.

 

Lungs: Lungs are well-inflated. Stable linear opacities in the upper 

lobes, likely reflecting scarring. Mild prominence of the central 

interstitial markings. Left basilar atelectatic changes. No 

consolidation.

 

Pleura:  There is no pleural effusion or pneumothorax.

 

Heart and mediastinum: Cardiac silhouette is mildly prominent. 

Pulmonary vasculature is indistinct. Marked atherosclerotic 

calcification aortic arch.

 

Bones:  No acute bony abnormality. Generalized osteopenia.

 

IMPRESSION: 

 

1. findings may represent central venous congestion with mild 

interstitial edema. No consolidation or effusion.

2. Left basal atelectasis. 

 

 

Otoniel Howard M.D.  

Dictated by:  Otoniel Howard M.D. on 6/12/2018 at 13:28     

Electronically approved by:  Otoniel Howard M.D. on 6/12/2018 at 

13:28

## 2018-06-12 NOTE — XMS REPORT
Clinical Summary

 Created on: 2018



Ginger Clinton

External Reference #: [id number]

: 1929

Sex: Female



Demographics







 Address  [address] [place]  [address]

 

 Home Phone  [phone]

 

 Preferred Language  English

 

 Marital Status  

 

 Voodoo Affiliation  Unknown

 

 Race  

 

 Ethnic Group  Non-





Author







 Author  [facility]

 

 Organization  [facility]

 

 Address  Unknown

 

 Phone  Unavailable







Support







 Name  Relationship  Address  Phone

 

 Sara Vega  EC[employer]  Unknown  [phone]

 

 Danay Moon  [employer]  Unknown  [phone]







Care Team Providers







 Care Team Member Name  Role  Phone

 

 MELANIE Del Angel MD  PCP  [phone]







Allergies

No Known Allergies



Current Medications







      



  Prescription   Sig.   Disp.   Refills   Start   End Date   Status



      Date  

 

      



  oxybutynin (DITROPAN) 5   Take 5 mg by mouth 3       Active



  MG tablet   (three) times a day.     

 

      



  valsartan (DIOVAN) 160 MG   Take 160 mg by mouth       Active



  tablet   daily. Only take if     



   systolic is over 135     

 

      



  alendronate (FOSAMAX) 10   Take 10 mg by mouth daily       Active



  MG tablet   before breakfast. Take in     



   the morning with a full     



   glass of water on an     



   empty stomach, do NOT     



   take anything else by     



   mouth or lie down for the     



   next 30 min.     

 

      



  aspirin (ECOTRIN) 81 MG   Take 81 mg by mouth       Active



  enteric coated tablet   daily.     

 

      



  pantoprazole (PROTONIX)   Take 40 mg by mouth 2      12/15/20   Discontin



  40 MG EC tablet   (two) times a day.      17   ued

 

      



  acetaminophen (TYLENOL)   Take 325 mg by mouth as      20   Discontin



  325 MG tablet   needed for fever.      17   ued

 

      



  aspirin (ECOTRIN) 81 MG   Take 1 tablet (81 mg   30 tablet   3   20   



  enteric coated tablet   total) by mouth daily for     17   17 



   30 days.     

 

      



  clopidogrel (PLAVIX) 75   Take 1 tablet (75 mg   30 tablet   3   20   



  mg tablet   total) by mouth daily for     17   17 



   30 days.     

 

      



  cefdinir (OMNICEF) 300 MG   Take 1 capsule (300 mg   20 capsule   0   20   



  capsule   total) by mouth 2 (two)     17   17 



   times a day for 10 days.     

 

      



  OYSCO 500/D 500   TK 1 T PO BID WF    0   10/03/20   12/28/20   Discontin



  mg(1,250mg) -200 unit per      17   17   ued



  tablet      

 

      



  clopidogrel (PLAVIX) 75   Take 75 mg by mouth      20   Discontin



  mg tablet   daily.      18   ued

 

      



  HYDROcodone-acetaminophen   Take 1 tablet by mouth     20   




  (NORCO) 7.5-325 mg per   every 6 (six) hours as     18   18 



  tablet   needed for moderate pain     



   for up to 20 doses. Max     



   Daily Amount: 4 tablets     

 

      



  acetaminophen-codeine   Take 1 tablet by mouth   10 tablet   0   20   



  (TYLENOL WITH CODEINE #3)   every 8 (eight) hours as     18   18 



  300-30 mg per tablet   needed for moderate pain     



   for up to 10 doses.     







Active Problems







 



  Problem   Noted Date

 

 



  Acute cystitis with hematuria   2017







Encounters







    



  Date   Type   Specialty   Care Team   Description

 

    



  2018   Emergency   Emergency Medicine   Melina Castro,   Left 
hip pain (Primary



     PA-C   Dx)



     Mazin Last MD 

 

    



  2018   Hospital   Radiology   Romaine Jack MD   Calculus of kidney;



   Encounter     Calculus of ureter

 

    



  2018   Ancillary   Radiology   Romaine Jack MD   Calculus of kidney;



   Orders     Calculus of ureter

 

    



  2018   Hospital   General Surgery   Romaine Jack MD 



   Encounter   

 

    



  2018   Anesthesia   General Surgery   Maida Gonzalez MD 



   Event   

 

    



  2018   Procedure Pass   General Surgery  

 

    



  2018   Surgery   General Surgery   Romaine Jack MD   Extracorporeal 
Shockwave



      Lithotripsy (Eswl)



      INITIAL-RIGHT

 

    



  2017   Hospital   General Surgery   Romaine Jack MD 



   Encounter   

 

    



  2017   Procedure Pass   General Surgery  

 

    



  2017   Surgery   General Surgery   Romaine Jack MD   **Canceled**



      Extracorporeal Shockwave



      Lithotripsy (Eswl)



      INITIAL-RIGHT

 

    



  2017   Patient   Quality   Mimi Rinaldi RN 



   Outreach   

 

    



  2017   Anesthesia   General Surgery   Valencai Story MD 



   Event   

 

    



  2017   Procedure Pass   General Surgery  

 

    



  2017   Surgery   General Surgery   Romaine Jack MD   Cysto, Stent 
insertion

 

    



  2017   Procedure Pass   General Internal Medicine  

 

    



  2017   [facility]   General Internal Medicine   Mundo Gutierrez MD   
Acute cystitis with



  -   Encounter    Rakesh De MD   hematuria (Primary Dx);



  2017     Leigh Rojas MD   Renal insufficiency;



      Non-ST elevated



      myocardial infarction

 

    



  2017   Transcribe   Access   Barber Montelongo   Hematuria 
syndrome



   Maye CHOE   (Primary Dx)



after 2017



Immunizations







  



  Name   Dates Previously Given   Next Due

 

  



  FLUCELVAX QUAD PF (0.5mL   2017 



  syringe)  







Family History







   



  Medical History   Relation   Name   Comments

 

   



  No Known Problems   Father  

 

   



  Stroke   Mother  









   



  Relation   Name   Status   Comments

 

   



  Father   

 

   



  Mother   







Social History







    



  Tobacco Use   Types   Packs/Day   Years Used   Date

 

    



  Never Smoker    

 

    



  Smokeless Tobacco: Never   



  Used   









   



  Alcohol Use   Drinks/Week   oz/Week   Comments

 

   



  No   









 



  Sex Assigned at Birth   Date Recorded

 

 



  Not on file 







Last Filed Vital Signs







  



  Vital Sign   Reading   Time Taken

 

  



  Blood Pressure   143/62   2018  3:15 PM CST

 

  



  Pulse   59   2018  3:15 PM CST

 

  



  Temperature   36.8   C (98.2   F)   2018 11:59 AM CST

 

  



  Respiratory Rate   14   2018  3:15 PM CST

 

  



  Oxygen Saturation   99%   2018  3:15 PM CST

 

  



  Inhaled Oxygen   -   -



  Concentration  

 

  



  Weight   49.9 kg (110 lb)   2018 11:56 AM CST

 

  



  Height   147.3 cm (4' 10")   2018 11:56 AM CST

 

  



  Body Mass Index   22.99   2018 11:56 AM CST







Plan of Treatment







   



  Health Maintenance   Due Date   Last Done   Comments

 

   



  SHINGRIX VACCINE (#1)   1979  

 

   



  ZOSTER VACCINE   1989  

 

   



  PNEUMOCOCCAL   1994  



  POLYSACCHARIDE VACCINE   



  AGE 65 AND OVER   

 

   



  PNEUMOCOCCAL-13   1994  

 

   



  INFLUENZA VACCINE   2018 







Implants







      



  Implanted   Type   Area      Device   Expiration   Model /



      Identifier   Date   Serial /



        Lot

 

      



  Stent Uretl Inlay [place] 6fr 22cm   Surgical   Right:   [employer]    2020   [id number] /



  W/ Ziggy Gonzalez Ltxf - [id number]   Stents   Ureter,   DIVISION     /



  Implanted: Qty: 1 on 2017 by    Native      [id number]



  Romaine Jack MD      







Procedures







    



  Procedure Name   Priority   Date/Time   Associated Diagnosis   Comments

 

    



  VA AN ELECTIVE   Routine   2018  



  SUPRAGLOTTIC AIRWAY    1:02 PM CST  

 

  



   Procedure



   Note -



   Gianfranco Zuleta CRNA -



   2018



   1:02 PM



   CST



   Airway



   Date/Time:



   1/3/2018



   12:58 PM



   Performed



   by:



   GIANFRANCO ZULETA



   Authorized



   by: MAIDA GONZALEZ





   Location:



   OR



   Urgency:



   Elective



   Difficult



   Airway: No



   Resident/C



   RNA/AA:



   GIANFRANCO ZULETA



   Performed



   by:



   resident/C



   RNA/AA



   Preoxygena



   guzman with



   100% O2:



   Yes



   C-spine



   Precaution



   s



   Maintained



   Throughout



   : Yes



   Mask



   Ventilatio



   n:  Not



   attempted



   Final



   Airway



   Type:



   Supraglott



   ic airway



   Final LMA:



   Classic



   LMA Size:



   3



   Number of



   Attempts



   at



   Approach:



   1



 

    



  VA AN ELECTIVE   Routine   2017  



  SUPRAGLOTTIC AIRWAY    11:52 AM CST  

 

  



   Procedure



   Note -



   Valencia Story MD -



   2017



   11:52 AM



   CST



   Airway



   Date/Time:



   2017



   11:15 AM



   Performed



   by:



   VALENCIA STORY



   Authorized



   by:



   VALENCIA STORY





   Location:



   OR



   Urgency:



   Elective



   Difficult



   Airway: No



   Preoxygena



   guzman with



   100% O2:



   Yes



   C-spine



   Precaution



   s



   Maintained



   Throughout



   : Yes



   Mask



   Ventilatio



   n:  Easy



   mask



   Final



   Airway



   Type:



   Supraglott



   ic airway



   Final LMA:



   Classic



   LMA Size:



   3



   Number of



   Attempts



   at



   Approach:



   1



 

    



  ECHOCARDIOGRAM 2D   Routine   2017    Results for this



  COMPLETE W MMODE SPECTRAL    4:53 PM CST    procedure are in the



  COLOR DOPPLER (49764)      results section.



after 2017



Results

* XR Hip 2-3 View Left (2018  1:35 PM)





 



  Specimen   Performing Laboratory

 

 



   [facility]



   [address] [place] [address]









 Narrative

 

 



EXAMINATION:XR HIP 2-3 VIEWS LEFT



 



CLINICAL HISTORY:hip pain



 



COMPARISON:None.



 



FINDINGS: Lungs are severely and diffusely osteopenic. The right hip 
demonstrates joint space narrowing and. On the left a left total hip 
arthroplasty is in place. A long stemmed femoral component is present with a 
cerclage wire although there appears to



 be a fracture with separation of the greater trochanter cranially and lesser 
trochanter medially these findings appear unchanged from a prior CT scan from 
2017 there may be more distraction of the fracture fragments.



 



 



IMPRESSION:Postop changes on the left with a left total hip arthroplasty. 
Separation of the greater trochanter superiorly lesser trochanter medially 
stable since previous.



 



Left hip: AP and frog-lateral views: No additional findings of significance are 
noted. There is no evidence of loosening of the prosthesis. The femoral 
component appears well-seated in the acetabular component. The fragmentation of 
the greater and lesser



 trochanters are again noted.



 



IMPRESSION: Postop changes as described with stable fragmentation of the 
greater and lesser trochanters as well as a fragment along lateral shaft the 
proximal diaphyseal region. This also appears chronic



 



 



[id number]



 









 Procedure Note

 

 



 Interface, Radiology Results Incoming - 2018  1:57 PM CST



EXAMINATION:  XR HIP 2-3 VIEWS LEFT



CLINICAL HISTORY:  hip pain



COMPARISON:  None.



FINDINGS: Lungs are severely and diffusely osteopenic. The right hip 
demonstrates joint space narrowing and. On the left a left total hip 
arthroplasty is in place. A long stemmed femoral component is present with a 
cerclage wire although there appears to

 be a fracture with separation of the greater trochanter cranially and lesser 
trochanter medially these findings appear unchanged from a prior CT scan from 
2017 there may be more distraction of the fracture fragments.





IMPRESSION:  Postop changes on the left with a left total hip arthroplasty. 
Separation of the greater trochanter superiorly lesser trochanter medially 
stable since previous.



Left hip: AP and frog-lateral views: No additional findings of significance are 
noted. There is no evidence of loosening of the prosthesis. The femoral 
component appears well-seated in the acetabular component. The fragmentation of 
the greater and lesser

 trochanters are again noted.



IMPRESSION: Postop changes as described with stable fragmentation of the 
greater and lesser trochanters as well as a fragment along lateral shaft the 
proximal diaphyseal region. This also appears chronic





[id number]







* PV duplex venous lower extremity (2018 12:45 PM)





 



  Specimen   Performing Laboratory

 

 



    [facility]



   [address] [place] [address]









 Narrative

 

 



 The left lower extremity was negative for deep vein thrombosis.



 





* XR Abdomen 1 Vw (2018 10:35 AM)





 



  Specimen   Performing Laboratory

 

 



    [facility]



   [address] [place] [address]









 Narrative

 

 



EXAMINATION:XR ABDOMEN 1 VW



 



CLINICAL HISTORY:N20.0 Calculus of kidney, N20.1 Calculus of ureter



 



COMPARISON:CT abdomen 2017



 



FINDINGS:



 



The bowel gas pattern is nonspecific. There is a double-J stent in the right 
ureter, in good position.



 No pathologic masses or calcifications are identified.



 The lung bases are free of acute disease. Regional skeletal structures are 
unremarkable.



 



IMPRESSION:



No plain film evidence of urinary tract calculus.



 



[facility]-[id number]



 









 Procedure Note

 

 



 Interface, Radiology Results Incoming - 2018  2:36 PM CST



EXAMINATION:  XR ABDOMEN 1 VW



CLINICAL HISTORY:  N20.0 Calculus of kidney, N20.1 Calculus of ureter



COMPARISON:  CT abdomen 2017



FINDINGS:



The bowel gas pattern is nonspecific. There is a double-J stent in the right 
ureter, in good position.

 No pathologic masses or calcifications are identified.

 The lung bases are free of acute disease. Regional skeletal structures are 
unremarkable.



IMPRESSION:

No plain film evidence of urinary tract calculus.



[facility]-[id number]







* XR Chest 1 Vw Portable (2017  9:11 AM)



Only the most recent of 3 results within the time period is included.





 



  Specimen   Performing Laboratory

 

 



   [address] [address]









 Narrative

 

 



EXAMINATION:XR CHEST 1 VW PORTABLE



 



CLINICAL HISTORY:Congestive Heart Failure



 



COMPARISON:Most Recent



 



IMPRESSION:



 



Heart and mediastinum are stable. Mild interstitial opacities without acute 
consolidations. Bones are osteopenic. Right shoulder arthrosis. 



 



[facility]-[id number]



 









 Procedure Note

 

 



 Interface, Radiology Results Incoming - 2017  9:15 AM CST



EXAMINATION:  XR CHEST 1 VW PORTABLE



CLINICAL HISTORY:  Congestive Heart Failure



COMPARISON:  Most Recent



IMPRESSION:



Heart and mediastinum are stable. Mild interstitial opacities without acute 
consolidations. Bones are osteopenic. Right shoulder arthrosis. 



[facility]-[id number]







* Estimated GFR (2017  7:07 AM)



Only the most recent of 7 results within the time period is included.





  



  Component   Value   Ref Range

 

  



  GFR Non Af Amer   33 (A)   mL/min/1.73 m2

 

  



  GFR Af Amer   40 (A)   mL/min/1.73 m2



   Comment: 



   Chronic kidney disease: <60 mL/min/1.73m2 



   Kidney failure: <15 mL/min/1.73m2 



   The estimated GFR is calculated from the 



   IDMS-traceable Modification of Diet 



   in Renal Disease Equation. The accuracy of the 



   calculation is poor when the 



   creatinine is normal. Calculated values >90 



   mL/min/1.73m2 are not reported. 



   This equation has not been validated in children 



   (<18 years), pregnant 



   women, the elderly (>70 years), or ethnic groups 



   other than Caucasians and 



    Americans. 









 



  Specimen   Performing Laboratory

 

 



  Plasma specimen   [facility] DEPARTMENT OF PATHOLOGY AND GENOMIC MEDICINE



   [address] [place]



   [place] [address]





* CBC with platelet and differential (2017  7:07 AM)



Only the most recent of 8 results within the time period is included.





  



  Component   Value   Ref Range

 

  



  WBC   9.4   4.2 - 11.0 k/uL

 

  



  RBC   3.30 (L)   4.04 - 5.86 m/uL

 

  



  HGB   9.8 (L)   11.5 - 15.3 g/dL

 

  



  HCT   30.0 (L)   34.0 - 45.0 %

 

  



  MCV   90.9   80.0 - 98.0 fL

 

  



  MCH   29.7   27.0 - 34.0 pg

 

  



  MCHC   32.7   31.5 - 36.5 g/dL

 

  



  RDW - SD   47.7   37.0 - 51.0 fL

 

  



  MPV   10.6 (H)   7.4 - 10.4 fL

 

  



  Platelet count   194   150 - 400 k/uL

 

  



  Nucleated RBC   0.00   /100 WBC

 

  



  Neutrophils   74.1 (H)   36.0 - 66.0 %

 

  



  Lymphocytes   18.4 (L)   24.0 - 44.0 %

 

  



  Monocytes   5.6   0.0 - 6.0 %

 

  



  Eosinophils   1.0   0.0 - 6.0 %

 

  



  Basophils   0.1   0.0 - 1.2 %

 

  



  Immature granulocytes   0.8   0.0 - 1.0 %









 



  Specimen   Performing Laboratory

 

 



  Blood   [facility] DEPARTMENT OF PATHOLOGY AND [facility]



   [address]



   [place] [address]





* Phosphorus level (2017  7:07 AM)



Only the most recent of 4 results within the time period is included.





  



  Component   Value   Ref Range

 

  



  Phosphorus   3.5   2.5 - 4.5 mg/dL









 



  Specimen   Performing Laboratory

 

 



  Plasma specimen   [facility] DEPARTMENT OF PATHOLOGY AND GENOMIC MEDICINE



   [address]



   [place] [address]





* Magnesium level (2017  7:07 AM)



Only the most recent of 4 results within the time period is included.





  



  Component   Value   Ref Range

 

  



  Magnesium   2.10   1.60 - 2.40 mg/dL









 



  Specimen   Performing Laboratory

 

 



  Plasma specimen   [facility] DEPARTMENT OF PATHOLOGY AND GENOMIC MEDICINE



   [address] [place] [address] [address]



   [place] [address]





* Basic metabolic panel (2017  7:07 AM)



Only the most recent of 5 results within the time period is included.





  



  Component   Value   Ref Range

 

  



  Sodium   143   135 - 150 mEq/L

 

  



  Potassium   3.4 (L)   3.5 - 5.0 mEq/L

 

  



  Chloride   107   100 - 109 mEq/L

 

  



  CO2   29   24 - 32 mmol/L

 

  



  Anion gap   7   7 - 15 mEq/L



   Comment: 



   Starting from  , anion gap 



   calculation 



   no longer incorporates potassium. Please note the 



   change. 

 

  



  BUN   31 (H)   7 - 18 mg/dL

 

  



  Creatinine   1.5   0.8 - 1.5 mg/dL

 

  



  Glucose   87   65 - 100 mg/dL

 

  



  Calcium   7.7 (L)   8.6 - 10.7 mg/dL









 



  Specimen   Performing Laboratory

 

 



  Plasma specimen   [facility] DEPARTMENT OF PATHOLOGY AND GENOMIC MEDICINE



   [address]



   [place] [address]





* FL Pyelogram Retrograde (2017 11:48 AM)





 



  Specimen   Performing Laboratory

 

 



    [facility]



   [address] [place] [address]









 Narrative

 

 



EXAMINATION:FL PYELOGRAM RETROGRADE



 



CLINICAL HISTORY:Right urinary tract calculus



 



COMPARISON:None.



 



TECHNIQUE:



C-arm fluoroscopy was performed with 3 fluoroscopic spot images taken of the 
central abdomen and a portion of the upper pelvis upper pelvis in the anterior 
projection in a narrow field of view in the OR.



 



A total KV of79, and mA of 2.9, and fluoroscopic time of28 seconds was 
offered to  for the procedure.



 



The DAP (dose absorbed product) is: 4045 mGy-cm^2



 



A total of5 mL of Omnipaque 300 was injected by .



 



FINDINGS:



 



The  film demonstrates an oval opaque density projected to the right of 
the L3-L4 disc interspace level and represents the proximal right ureteric 
calculus.



 



On subsequent image the density presents as a filling defect with poor flow of 
contrast proximal to this site. Distal to this filling defect isn't opacified 
right ureter were which is unremarkable.



 



The final image demonstrate satisfactory placement of right upper urinary tract 
stent.



 



IMPRESSION:



 



Radiopaque calculus in the proximal right ureter.



 



Satisfactory placement of right upper urinary tract stent.



 



 [id number]



 









 Procedure Note

 

 



 Interface, Radiology Results Incoming - 2017  3:56 PM CST



EXAMINATION:  FL PYELOGRAM RETROGRADE



CLINICAL HISTORY:  Right urinary tract calculus    



COMPARISON:  None.



TECHNIQUE:

C-arm fluoroscopy was performed with 3 fluoroscopic spot images taken of the 
central abdomen and a portion of the upper pelvis upper pelvis in the anterior 
projection in a narrow field of view in the OR.



A total KV of  79, and mA of 2.9, and fluoroscopic time of  28 seconds was 
offered to Dr. Jack for the procedure.



The DAP (dose absorbed product) is: 4045 mGy-cm^2



A total of  5 mL of Omnipaque 300 was injected by Dr. Jack.



FINDINGS:



The  film demonstrates an oval opaque density projected to the right of 
the L3-L4 disc interspace level and represents the proximal right ureteric 
calculus.



On subsequent image the density presents as a filling defect with poor flow of 
contrast proximal to this site. Distal to this filling defect isn't opacified 
right ureter were which is unremarkable.



The final image demonstrate satisfactory placement of right upper urinary tract 
stent.



IMPRESSION:



Radiopaque calculus in the proximal right ureter.



Satisfactory placement of right upper urinary tract stent.



 [id number]







* Troponin (2017  7:23 AM)



Only the most recent of 6 results within the time period is included.





  



  Component   Value   Ref Range

 

  



  Troponin   0.07   0.00 - 0.60 ng/mL



   Comment: 



   0.11 - 1.49 ng/ml                May indicate 



   increased risk of acute 



   coronary 



   syndrome. 



   >=1.5 ng/ml                            Consistent 



   with acute myocardial 



   infarction. 



   The diagnostic value of a single normal or 



   non-diagnostic 



   result is questionable.    Serial samples at 2-6 



   hour intervals 



   are required to rule out acute myocardial 



   injury. 









 



  Specimen   Performing Laboratory

 

 



  Plasma specimen   [facility] DEPARTMENT OF PATHOLOGY AND GENOMIC MEDICINE



   [address]



   [place] [address]





* Manual differential (2017  7:23 AM)



Only the most recent of 2 results within the time period is included.





  



  Component   Value   Ref Range

 

  



  Manual differential   PERFORMED 

 

  



  Neutrophils   94.0 (H)   36.0 - 66.0 %

 

  



  Lymphocytes   4.0 (L)   24.0 - 44.0 %

 

  



  Monocytes   2.0   0.0 - 6.0 %

 

  



  Eosinophils   0.0   0.0 - 6.0 %

 

  



  Basophils   0.0   0.0 - 1.2 %

 

  



  Metamyelocytes   0   0 - 1 %

 

  



  Promyelocytes   0   0 - 1 %

 

  



  Platelet slide review   Karly adequate 

 

  



  Enlarged platelets   Few 









 



  Specimen   Performing Laboratory

 

 



   [facility] DEPARTMENT OF PATHOLOGY AND GENOMIC MEDICINE



   [address]



   [place] [address]





* Partial thromboplastin time, activated (2017  7:23 AM)



Only the most recent of 2 results within the time period is included.





  



  Component   Value   Ref Range

 

  



  PTT   205.5 (HH)   23.0 - 36.0 sec



   Comment: 



   PTT therapeutic range for unfractionated heparin 



   is 



   61.0-112.0 seconds which corresponds to Anti-Xa 



   0.3-0.7 U/ml. 



   Note:    Change in Panic Value 



   The PTT Panic Value is changing from 110 sec. to 



   100 sec. 



   due to new instrumentation and reagents. 



   Correlation studies have been performed to 



   validate this result. 



   Results called to and read back by SONIA KIRK at    08:14    2017 WEB 



   REPEAT CKY=046.7 









 



  Specimen   Performing Laboratory

 

 



  Blood   [facility] DEPARTMENT OF PATHOLOGY AND GENOMIC MEDICINE



   [address] [place] [address] [address]



   [place] [address]





* Prothrombin time with INR (2017  7:23 AM)



Only the most recent of 2 results within the time period is included.





  



  Component   Value   Ref Range

 

  



  Prothrombin time   15.7 (H)   12.0 - 15.0 sec

 

  



  INR   1.23 (H)   0.92 - 1.12



   Comment: 



   For patients on anticoagulant therapy, reference 



   ranges below: 



   Indication: 



   INR Value 



   Treatment of Venous 



   Thrombosis,                     2.0-3.0 



   pulmonary emboli, or prophylaxis 



   of a venous thrombosis, or systemic 



   emboli. 



   High dose, high risk 



   patients                         3.0-4.5 



   with mechanical valves. 



   NOTE:    INR values over 3.0 are sometimes 



   associated with 



   gastrointestinal hemorrhage, especially values 



   over 4.0. 









 



  Specimen   Performing Laboratory

 

 



  Blood   [facility] DEPARTMENT OF PATHOLOGY AND GENOMIC MEDICINE



   [address] [place] [address] [address]



   [place] [address]





* Anti Xa, unfractionated (2017  7:23 AM)



Only the most recent of 7 results within the time period is included.





  



  Component   Value   Ref Range

 

  



  Anti Xa, unfractionated   0.45Comment: Therapeutic Range:  0.30 - 0.70 U/mL   
0.30 - 0.70 U/mL









 



  Specimen   Performing Laboratory

 

 



  Blood   [facility] DEPARTMENT OF PATHOLOGY AND GENOMIC MEDICINE



   [address] [place] [address] [address]



   [place] [address]





* T3, free (2017  7:23 AM)





  



  Component   Value   Ref Range

 

  



  T3, free   0.68 (L)   2.18 - 3.98 pmol/L









 



  Specimen   Performing Laboratory

 

 



  Plasma specimen   [facility] DEPARTMENT OF PATHOLOGY AND GENOMIC MEDICINE



   [address] [place] [address]





* Thyroid stimulating hormone (2017  7:23 AM)





  



  Component   Value   Ref Range

 

  



  TSH   0.29 (L)   0.38 - 4.82 uIU/mL









 



  Specimen   Performing Laboratory

 

 



  Plasma specimen   [facility] DEPARTMENT OF PATHOLOGY AND [facility] MEDICINE



   [address] [place] [address]





* B natriuretic peptide (2017  7:23 AM)



Only the most recent of 2 results within the time period is included.





  



  Component   Value   Ref Range

 

  



  BNP   1,360 (H)   0 - 100 pg/mL









 



  Specimen   Performing Laboratory

 

 



  Blood   [facility] DEPARTMENT OF PATHOLOGY AND [facility] MEDICINE



   [address] [place] [address]





* Hemoglobin A1c (2017  7:23 AM)





  



  Component   Value   Ref Range

 

  



  Hemoglobin A1C   6.2 (H)   4.0 - 6.0 %



   Comment: 



   ************************************************** 



   *** 



   Less than 6% -                 Goal of therapy for 



   Type II Diabetes 



   Less than 7%-                    Goal of therapy 



   for Type I Diabetes 



   Less than 8%-                    Acceptable 



   control for Type I or Type 



   II 



   Diabetes 



   Greater than 8%-                Unacceptable 



   control; action indicated. 



   (A 



   DA94) 









 



  Specimen   Performing Laboratory

 

 



  Blood   [facility] DEPARTMENT OF PATHOLOGY AND [facility] MEDICINE



   [address] [place] [address]





* Ionized calcium (2017  7:23 AM)



Only the most recent of 2 results within the time period is included.





  



  Component   Value   Ref Range

 

  



  pH   7.37 

 

  



  Ionized calcium   1.06 (L)   1.11 - 1.32 mmol/L









 



  Specimen   Performing Laboratory

 

 



  Plasma specimen   [facility] DEPARTMENT OF PATHOLOGY AND GENOMIC MEDICINE



   [address] [place] [address]





* Lipid panel (2017  7:23 AM)





  



  Component   Value   Ref Range

 

  



  Cholesterol   113 (L)   120 - 200 mg/dL

 

  



  Triglycerides   131   50 - 150 mg/dL

 

  



  HDL cholesterol   16 (L)   40 - 60 mg/dL

 

  



  LDL cholesterol   82Comment: Result obtained by direct LDL   mg/dL



   measurement 

 

  



  Lipid panel   See below 



  interpretation   Comment: 



   Total Cholesterol (mg/dL) 



   LDL Cholesterol (mg/dL) 



   <200 



   Desirable                                <100 



   Optimal 



   200-239            Borderline-high 



   100-129            Near or above optimal 



   >=240                High 



   130-159            Borderline-high 



   160-189            High 



   >=190                Very high 



   HDL Cholesterol 



   (mg/dL)                             Triglycerides 



   (mg/dL) 



   <40                    Low 



   <150                 Normal 



   >=60 



   High 



   150-199            Borderline-high 



   200-499            High 



   >=500                Very high 



   Risk Catergories that modify LDL goals. 



   Risk 



   Catergories 



   LDL goal (mg/dL) 



   CHD and CHD risk 



   equivalent                        <100 



   (10-year risk >20%) 



   Multiple (2+) risk factors 



   <130 



   (10-year risk=<20%) 



   0-1 risk 



   factors 



   <160 



   (<10-year 



   risk) 



   Defining levels of lipids in metabolic syndrome 



   Triglycerides 



   >=150 mg/dL 



   HDL 



   Cholesterol 



   Men 



   <40 mg/dL 



   Women 



   <50 mg/dL 



   Non-HDL cholesterol is a second target for therapy 



   in persons 



   with high triglycerides (>=200 



   mg/dL) 









 



  Specimen   Performing Laboratory

 

 



  Plasma specimen   [facility] DEPARTMENT OF PATHOLOGY AND GENOMIC MEDICINE



   [address]



   [place] [address]





* Comprehensive metabolic panel (2017  7:23 AM)



Only the most recent of 2 results within the time period is included.





  



  Component   Value   Ref Range

 

  



  Sodium   141   135 - 150 mEq/L

 

  



  Potassium   4.2   3.5 - 5.0 mEq/L

 

  



  Chloride   110 (H)   100 - 109 mEq/L

 

  



  CO2   20 (L)   24 - 32 mmol/L

 

  



  Anion gap   11   7 - 15 mEq/L



   Comment: 



   Starting from  , anion gap 



   calculation 



   no longer incorporates potassium. Please note the 



   change. 

 

  



  BUN   28 (H)   7 - 18 mg/dL

 

  



  Creatinine   1.5   0.8 - 1.5 mg/dL

 

  



  Glucose   129 (H)   65 - 100 mg/dL

 

  



  Calcium   7.4 (L)   8.6 - 10.7 mg/dL

 

  



  Protein   6.9   6.3 - 8.2 g/dL

 

  



  Albumin   2.0 (L)   3.2 - 5.0 g/dL

 

  



  A/G ratio   0.4 (L)   0.7 - 3.8

 

  



  Alkaline phosphatase   135 (H)   30 - 120 U/L

 

  



  AST   19   15 - 37 U/L

 

  



  ALT   25 (L)   30 - 65 U/L

 

  



  Total bilirubin   0.6   0.2 - 1.2 mg/dL









 



  Specimen   Performing Laboratory

 

 



  Plasma specimen   [facility] DEPARTMENT OF PATHOLOGY AND GENOMIC MEDICINE



   [address]



   [place] [address]





* Lactic acid level, SEPSIS - Now and repeat 2x every 3 hours (2017  1:18 
PM)



Only the most recent of 2 results within the time period is included.





  



  Component   Value   Ref Range

 

  



  Lactic acid   1.5   0.5 - 2.2 mmol/L









 



  Specimen   Performing Laboratory

 

 



  Blood   [facility] DEPARTMENT OF PATHOLOGY AND GENOMIC MEDICINE



   [address]



   [place] [address]





* Occult blood, stool (2017 10:58 AM)





  



  Component   Value   Ref Range

 

  



  Occult blood, stool   Negative for occult blood. 



   Comment: 



   Specimen Information 



   Specimen Source: Stool 



   Specimen Site: Not otherwise specified 









 



  Specimen   Performing Laboratory

 

 



  Stool - Not otherwise   [facility] DEPARTMENT OF PATHOLOGY AND GENOMIC MEDICINE



  specified   [address]



   [place] [address]





* Lactic acid level (2017  5:50 AM)



Only the most recent of 3 results within the time period is included.





  



  Component   Value   Ref Range

 

  



  Lactic acid   1.2   0.5 - 2.2 mmol/L









 



  Specimen   Performing Laboratory

 

 



  Blood   [facility] DEPARTMENT OF PATHOLOGY AND GENOMIC MEDICINE



   [address] [place]



   [place] [address]





* Blood culture, aerobic & anaerobic (2017  1:00 AM)



Only the most recent of 4 results within the time period is included.





  



  Component   Value   Ref Range

 

  



  Blood culture isolate   No growth after 5 days of incubation. 



   Comment: 



   Specimen Information 



   Specimen Source: Blood 



   Specimen Site: Left Hand 









 



  Specimen   Performing Laboratory

 

 



  Blood   [facility] DEPARTMENT OF PATHOLOGY AND GENOMIC MEDICINE



   [address] [place] [address]





* Influenza antigen (2017 12:52 AM)



Only the most recent of 2 results within the time period is included.





  



  Component   Value   Ref Range

 

  



  Influenza antigen   Negative for Influenza A/B antigen. 



   Comment: 



   Specimen Information 



   Specimen Source: Nares 



   Specimen Site: Left 









 



  Specimen   Performing Laboratory

 

 



  Nares - Left   [facility] DEPARTMENT OF PATHOLOGY AND GENOMIC MEDICINE



   [address]



   [place] [address]





* Urinalysis screen and microscopy, with reflex to culture (2017 10:05 PM)



Only the most recent of 2 results within the time period is included.





  



  Component   Value   Ref Range

 

  



  Specimen site   Clean catch 

 

  



  Color, UA   Yellow 

 

  



  Appearance, UA   Slightly-Cloudy 

 

  



  Specific gravity, UA   1.004   1.001 - 1.035

 

  



  pH, UA   7.0   5.0 - 8.5

 

  



  Protein, UA   Negative   Negative

 

  



  Glucose, UA   Negative   Negative

 

  



  Ketones, UA   Negative   Negative

 

  



  Bilirubin, UA   Negative   Negative

 

  



  Blood, UA   Small (A)   Negative

 

  



  Nitrite, UA   Negative   Negative

 

  



  Urobilinogen, UA   Negative   <2.0

 

  



  Leukocyte esterase, UA   Large (A)   Negative

 

  



  Epithelial cells, UA   Few   /HPF

 

  



  WBC, UA   75 (H)   0 - 5 /HPF

 

  



  RBC, UA   20 (A)   0 - 5 /HPF

 

  



  Bacteria, UA   Trace   None seen

 

  



  Yeast, UA   None seen 

 

  



  Yeast with pseudohyphae,   None seen 



  UA  









 



  Specimen   Performing Laboratory

 

 



  Urine   [facility] DEPARTMENT OF PATHOLOGY AND GENOMIC MEDICINE



   [address] [place] [address] [address]



   [place] [address]





* Sodium level, urine, random (2017 10:05 PM)





  



  Component   Value   Ref Range

 

  



  Sodium, urine, random   57   mEQ/L









 



  Specimen   Performing Laboratory

 

 



  Urine   [facility] DEPARTMENT OF PATHOLOGY AND GENOMIC MEDICINE



   [address] [place] [address]





* Gram stain (2017 10:05 PM)



Only the most recent of 2 results within the time period is included.





  



  Component   Value   Ref Range

 

  



  Gram stain result   Occasional WBC's 



   No organisms seen 



   Comment: 



   Specimen Information 



   Specimen Source: Urine 



   Specimen Site: See UA 









 



  Specimen   Performing Laboratory

 

 



  Urine   [facility] DEPARTMENT OF PATHOLOGY AND GENOMIC MEDICINE



   [address] [place] [address]





* Urine culture (2017 10:05 PM)



Only the most recent of 2 results within the time period is included.





  



  Component   Value   Ref Range

 

  



  Urine culture isolate   No growth after 2 days. 



   Comment: 



   Specimen Information 



   Specimen Source: Urine 



   Specimen Site: See UA 









 



  Specimen   Performing Laboratory

 

 



  Urine   [facility] DEPARTMENT OF PATHOLOGY AND GENOMIC MEDICINE



   [address] [place] [address]





* Vitamin D 25 hydroxy level (2017  6:01 AM)





  



  Component   Value   Ref Range

 

  



  Vitamin D, 25-hydroxy   14.7 (L)   30.0 - 150.0 ng/mL



   Comment: 



   This assay reports the sum of 25-hydroxy vitamin 



   D3 and 25-hydroxy vitamin 



   D2. 



   Reference range: 



   0-17 years: 



   Deficiency: less than 20ng/mL 



   Optimum level: greater than or equal to 20 ng/mL. 



   18 years and older: 



   Deficiency: less than 20ng/mL 



   Insufficiency: 20-29 ng/mL 



   Optimum Level: 30-80 ng/mL 



   The assay reportable range is 3.4    155.9 ng/mL. 



   Levels higher than 150 



   ng/mL may be associated with toxicity. 



   If toxicity is clinically suspected and the 



   reported result is >155.9 



   ng/mL,contact lab for alternative methods to 



   obtain a definitive    level. 



   If separate quantitation of 25-hydroxy vitamin D3 



   and 25-hydroxy vitamin D2 



   is needed, please contact lab for alternative 



   methods. 









 



  Specimen   Performing Laboratory

 

 



  Blood   [facility] DEPARTMENT OF PATHOLOGY AND GENOMIC MEDICINE



   [address] [place] [address]





* Parathyroid hormone (2017  6:01 AM)





  



  Component   Value   Ref Range

 

  



  PTH   107 (H)   15 - 65 pg/mL









 



  Specimen   Performing Laboratory

 

 



  Blood   [facility] DEPARTMENT OF PATHOLOGY AND GENOMIC MEDICINE



   [address] [place] [address]





* Hepatic function panel (2017  6:01 AM)





  



  Component   Value   Ref Range

 

  



  Albumin   1.9 (L)   3.2 - 5.0 g/dL

 

  



  Total bilirubin   0.7   0.2 - 1.2 mg/dL

 

  



  Bilirubin direct   0.3   0.0 - 0.4 mg/dL

 

  



  Alkaline phosphatase   148 (H)   30 - 120 U/L

 

  



  Protein   6.5   6.3 - 8.2 g/dL

 

  



  ALT   27 (L)   30 - 65 U/L

 

  



  AST   32   15 - 37 U/L









 



  Specimen   Performing Laboratory

 

 



  Plasma specimen   [facility] DEPARTMENT OF PATHOLOGY AND GENOMIC MEDICINE



   [address]



   [place] [address]





* Creatinine level, urine, random (2017  1:06 AM)





  



  Component   Value   Ref Range

 

  



  Creatinine, urine, random   35   mg/dL









 



  Specimen   Performing Laboratory

 

 



  Urine   [facility] DEPARTMENT OF PATHOLOGY AND GENOMIC MEDICINE



   [address]



   [place] [address]





* CBC hemogram (2017 10:48 PM)





  



  Component   Value   Ref Range

 

  



  WBC   10.5   4.2 - 11.0 k/uL

 

  



  RBC   3.09 (L)   4.04 - 5.86 m/uL

 

  



  HGB   9.3 (L)   11.5 - 15.3 g/dL

 

  



  HCT   27.0 (L)   34.0 - 45.0 %

 

  



  MCV   87.4   80.0 - 98.0 fL

 

  



  MCH   30.1   27.0 - 34.0 pg

 

  



  MCHC   34.4   31.5 - 36.5 g/dL

 

  



  RDW - SD   43.3   37.0 - 51.0 fL

 

  



  MPV   11.0 (H)   7.4 - 10.4 fL

 

  



  Platelet count   138 (L)   150 - 400 k/uL

 

  



  Nucleated RBC   0.00   /100 WBC









 



  Specimen   Performing Laboratory

 

 



  Blood   [facility] DEPARTMENT OF PATHOLOGY AND GENOMIC MEDICINE



   [address]



   [place] [address]





* US Renal (2017 10:07 PM)





 



  Specimen   Performing Laboratory

 

 



    [facility]



   [address] [place] [address]









 Narrative

 

 



EXAMINATION:US RENAL



 



CLINICAL HISTORY:Elevated abnormal renal function tests



 



COMPARISON:CT abdomen and pelvis dated 2017



 



FINDINGS: 



 



The right kidney measures 11.4 x 5.3 x 6.5 cm. There is a 16 mm shadowing stone 
identified in the right kidney parenchyma. There is moderate right-sided 
hydronephrosis. This is also noted on prior CT where there is a right UPJ 
stone. There is no mass or 



cyst.



 



Left kidney measures 9.4 x 5.1 x 4.5 cm. There is no mass, cyst or stone. There 
is no hydronephrosis.



 



The bladder is only minimally filled. Bilateral flow jets are noted.



 



 



 



IMPRESSION:



 



Renal ultrasound confirms a large 16 mm stone in the right kidney parenchymal 
as well as moderate right-sided hydronephrosis as noted on prior CT.



 



 



[facility]-[id number]



 









 Procedure Note

 

 



 Interface, Radiology Results Incoming - 2017 10:15 PM CST



EXAMINATION:  US RENAL



CLINICAL HISTORY:  Elevated abnormal renal function tests



COMPARISON:  CT abdomen and pelvis dated 2017



FINDINGS: 



The right kidney measures 11.4 x 5.3 x 6.5 cm. There is a 16 mm shadowing stone 
identified in the right kidney parenchyma. There is moderate right-sided 
hydronephrosis. This is also noted on prior CT where there is a right UPJ 
stone. There is no mass or 

cyst.



Left kidney measures 9.4 x 5.1 x 4.5 cm. There is no mass, cyst or stone. There 
is no hydronephrosis.



The bladder is only minimally filled. Bilateral flow jets are noted.







IMPRESSION:



Renal ultrasound confirms a large 16 mm stone in the right kidney parenchymal 
as well as moderate right-sided hydronephrosis as noted on prior CT.





[facility]-[id number]







* ECG ED Preliminary Interpretation - NOT AN ORDER (2017  6:00 PM)





 Unique De MD 20176:00 PM



ECG ED Preliminary Interpretation - Not an Order



Performed by: RAKESH DE



Authorized by: RAKESH DE 



 



Rate: 



ECG rate:60



ECG rate assessment: normal



Rhythm: 



Rhythm: sinus rhythm



Ectopy: 



Ectopy: none



QRS: 



QRS axis:Normal



ST segments: 



ST segments:Normal



T waves: 



T waves: normal





* Echocardiogram complete w contrast and 3D if needed (2017  4:53 PM)





  



  Component   Value   Ref Range

 

  



  Velocity Ratio (V1/V2)   0.63   m/s

 

  



  AoV Mean PG   7.33   mmHg

 

  



  AV LVOT peak gradient   5.65   mmHg

 

  



  MV mean gradient   2.32   mmHg

 

  



  MV valve area p 1/2   3.39   cm2



  method  

 

  



  PV Pk Grad   3.92   mmHg

 

  



  E/A ratio   0.72 

 

  



  E wave decelartion time   207.59   msec

 

  



  LVOT Diam,S   1.83   cm

 

  



  LVOT area   2.63   cm2

 

  



  LVOT Vmax   1.19   m/s

 

  



  LVOT VTI   0.25   m

 

  



  AoV Peak PG   14.19   mmHg

 

  



  MV Peak E Braeden   0.93   m/s

 

  



  MV stenosis pressure 1/2   64.85   ms



  time  

 

  



  MV Peak A Braeden   1.30   m/s

 

  



  AoV Area, Vmax   1.81   cm2

 

  



  AoV Area, VTI   2.01   cm2

 

  



  AoV Vmax   1.88   m/s

 

  



  Left Atrium Dimension   2.85   cm



  Anterior  

 

  



  PV VMAX   0.99   m/s

 

  



  RVSP (TR)   40.83   mmHg

 

  



  TR Vpeak   2.78   mm/s

 

  



  MV E A ratio   0.82   mmHg

 

  



  TR pk grad   30.83   mmHg

 

  



  MR peak grad   7.58   mmHg

 

  



  RVSP   40.83   mmHg

 

  



  Ao Root Diameter   3.20   cm

 

  



  AR Press Half Time   258.57   ms

 

  



  LVOT CO   4.54   l/min

 

  



  LVOT HR for LVOT CO   69.14   bpm

 

  



  MV Vmax   1.38   m

 

  



  MV VTI Tips   0.29   m

 

  



  AoV Vmn   1.27 

 

  



  AR slope   3.56 

 

  



  Ar Vmax   3.17 

 

  



  Ao Root Diameter   3.20   cm

 

  



  AoV VTI   0.39   m

 

  



  MV AE ratio   1.30 

 

  



  LVOT Vmn   0.85 

 

  



  Aov area Vmn   1.82   cm2

 

  



  LVOT mean grad   3.27   mmHg

 

  



  MAX Pred HR   131.75 

 

  



  85 of MPHR   111.99 

 

  



  AR DT   891.64   msec

 

  



  AR pk grad   40.28   mmHg

 

  



  Calc MPHR   131.75   bpm

 

  



  MV Decel slope   4.49   m/s2

 

  



  Pred Exer Dur R1   4.85 

 

  



  Pred METS R1   3.23 









 



  Specimen   Performing Laboratory

 

 



    [facility]



   [address] [place] [address]









 Narrative

 

 



 There is mild left ventricular concentric hypertrophy.



 Left Ventricular ejection fraction is 50 - 55%.



 Right ventricular size is normal.



 Left atrium size is mildly dilated.



 Trace mitral valve regurgitation



 The mitral valve appears thickened.



 No pericardial effusion



 





* MRI Lumbar Spine Wo Contrast (2017 12:59 PM)





 



  Specimen   Performing Laboratory

 

 



   [place] [address]









 Narrative

 

 



EXAMINATION:MRI LUMBAR SPINE WO CONTRAST



 



COMPARISON:None



 



CLINICAL HISTORY:incontinence



 



FINDINGS: Other than minor degenerative changes, the discs are unremarkable 
without significant bulge or protrusion. There is a very tiny anterior 
listhesis of L4 on L5 which in combination with facet hypertrophy and 
ligamentum flavum thickening results 



in relative narrowing of the spinal canal, but no true stenosis. The neural 
foramina are patent. The conus medullaris is normal. The bone marrow is 
unremarkable. There is L4-5 and L5-S1 degenerative facet change.



 



IMPRESSION: Unremarkable for age.



 



[facility]-[id number]



 









 Procedure Note

 

 



 Interface, Radiology Results Incoming - 2017  1:13 PM CST



EXAMINATION:  MRI LUMBAR SPINE WO CONTRAST



COMPARISON:  None



CLINICAL HISTORY:  incontinence



FINDINGS: Other than minor degenerative changes, the discs are unremarkable 
without significant bulge or protrusion. There is a very tiny anterior 
listhesis of L4 on L5 which in combination with facet hypertrophy and 
ligamentum flavum thickening results 

in relative narrowing of the spinal canal, but no true stenosis. The neural 
foramina are patent. The conus medullaris is normal. The bone marrow is 
unremarkable. There is L4-5 and L5-S1 degenerative facet change.



IMPRESSION: Unremarkable for age.



[facility]-[id number]







* CT Head Wo Contrast (2017 12:46 PM)





 



  Specimen   Performing Laboratory

 

 



   [place] [address]









 Narrative

 

 



EXAMINATION:CT HEAD WO CONTRAST



 



COMPARISON:None



 



CLINICAL HISTORY:incontinence



 



TECHNIQUE: Up to date CT equipment and radiation dose reduction technique were 
utilized.



 



FINDINGS: There are small vessel ischemic changes of the white matter. There 
are no other areas of abnormal density. There is no mass or extra-axial fluid 
collection. The ventricles and sulci are prominent compatible with age-related 
volume loss. There 



are calcifications in the internal carotid arteries. The sinuses and mastoids 
are clear.



 



IMPRESSION: Chronic age-related changes.



 



[facility]-[id number]



 









 Procedure Note

 

 



 Interface, Radiology Results Incoming - 2017 12:56 PM CST



EXAMINATION:  CT HEAD WO CONTRAST



COMPARISON:  None



CLINICAL HISTORY:  incontinence



TECHNIQUE: Up to date CT equipment and radiation dose reduction technique were 
utilized.



FINDINGS: There are small vessel ischemic changes of the white matter. There 
are no other areas of abnormal density. There is no mass or extra-axial fluid 
collection. The ventricles and sulci are prominent compatible with age-related 
volume loss. There 

are calcifications in the internal carotid arteries. The sinuses and mastoids 
are clear.



IMPRESSION: Chronic age-related changes.



[facility]-[id number]







* CT Abdomen Pelvis Wo Contrast (2017 12:46 PM)





 



  Specimen   Performing Laboratory

 

 



    [facility]



   [address] [place] [address]









 Narrative

 

 



EXAMINATION:CT ABDOMEN PELVIS WO CONTRAST



 



CLINICAL HISTORY:urine infection please do without ORAL or IV contrast



 



TECHNIQUE: Multiple axial images of the abdomen and pelvis were obtained 
without intravenous administration of iodinated contrast. Sagittal and coronal 
computerized reformatted images were also obtained. The lack of intravenous 
contrast reduces the 



sensitivity of detecting solid organ disease.



 



CT imaging was performed with iterative reconstruction technique and/or 
automated exposure control to reduce radiation dose.



 



COMPARISON:None.



 



IMPRESSION: 



1.Mild dependent atelectasis in the visualized lung bases. Top normal heart 
size. Coronary calcifications.



2.Normal liver, adrenals, spleen, and pancreas. The gallbladder is 
surgically absent.



3.1.5 cm elongated calcification in the right kidney. 7.9 mm stone in the 
proximal right ureter. Mild right hydronephrosis. Normal left kidney.



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 
the deep pelvis. The bladder is moderately distended but otherwise 
unremarkable. Uterus within normal limits. No free fluid.



5.Small hiatal hernia. Unremarkable small bowel. Unremarkable colon.



6.Generalized bone demineralization and degenerative changes.



7.Atherosclerotic calcifications of the abdominal aorta and its branches.



 



SUMMARY:



1.7.9 mm stone in the proximal right ureter with mild right hydronephrosis. 
Additional elongated calcification within the right kidney, possible additional 
stone. Query whether this could represent calcification around retained 
catheter fragment and 



recommend correlation for any prior history of nephrostomy catheters or 
ureteral stent.



2.Other findings as above.



 



[facility]-[id number]



 









 Procedure Note

 

 



 Interface, Radiology Results Incoming - 2017 12:56 PM CST



EXAMINATION:  CT ABDOMEN PELVIS WO CONTRAST



CLINICAL HISTORY:  urine infection please do without ORAL or IV contrast



TECHNIQUE: Multiple axial images of the abdomen and pelvis were obtained 
without intravenous administration of iodinated contrast. Sagittal and coronal 
computerized reformatted images were also obtained. The lack of intravenous 
contrast reduces the 

sensitivity of detecting solid organ disease.



CT imaging was performed with iterative reconstruction technique and/or 
automated exposure control to reduce radiation dose.



COMPARISON:  None.



IMPRESSION: 

 1.  Mild dependent atelectasis in the visualized lung bases. Top normal heart 
size. Coronary calcifications.

 2.  Normal liver, adrenals, spleen, and pancreas. The gallbladder is 
surgically absent.

 3.  1.5 cm elongated calcification in the right kidney. 7.9 mm stone in the 
proximal right ureter. Mild right hydronephrosis. Normal left kidney.

 4.  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 
the deep pelvis. The bladder is moderately distended but otherwise 
unremarkable. Uterus within normal limits. No free fluid.

 5.  Small hiatal hernia. Unremarkable small bowel. Unremarkable colon.

 6.  Generalized bone demineralization and degenerative changes.

 7.  Atherosclerotic calcifications of the abdominal aorta and its branches.



SUMMARY:

 1.  7.9 mm stone in the proximal right ureter with mild right hydronephrosis. 
Additional elongated calcification within the right kidney, possible additional 
stone. Query whether this could represent calcification around retained 
catheter fragment and 

recommend correlation for any prior history of nephrostomy catheters or 
ureteral stent.

 2.  Other findings as above.



[facility]-[id number]







* ECG 12 lead (2017  9:50 PM)





  



  Component   Value   Ref Range

 

  



  Ventricular rate   60 

 

  



  Atrial rate   60 

 

  



  VA interval   178 

 

  



  QRSD interval   80 

 

  



  QT interval   438 

 

  



  QTC interval   438 

 

  



  P axis 1   18 

 

  



  QRS axis 1   60 

 

  



  T wave axis   55 

 

  



  EKG impression   Normal sinus rhythm-Normal ECG-No previous ECGs 



   available-Electronically Signed By Vanessa Wiggins MD ([id number]) on 2017 7:11:12 AM 









 



  Specimen   Performing Laboratory

 

 



   [facility]



   [address] [place] [address]





* Creatine kinase, total (CPK) (2017  9:25 PM)





  



  Component   Value   Ref Range

 

  



  Creatine kinase   458 (H)   61 - 224 U/L









 



  Specimen   Performing Laboratory

 

 



  Plasma specimen   [facility] DEPARTMENT OF PATHOLOGY AND GENOMIC MEDICINE



   [facility] [address]



   [place] [address]





after 2017



Insurance







     



  Payer   Benefit   Subscriber ID   Type   Phone   Address



   Plan /    



   Group    

 

     



  HUMANA MEDICARE   HUMANA   xxxxxxxxx   PPO  



   MEDICARE    



   PPO/PFFS/E    



   [facility]    

 

     



  AMERIGROUP   AMERIGROUP   xxxxxxxxx   O  



   STAR+PLUS    



   [facility]    









     



  Guarantor Name   Account   Relation to   Date of   Phone   Billing Address



   Type   Patient   Birth  

 

     



  MELINASAMUELGINGER   Personal/F   Self   1929   Home:   [address]     [phone]   [place] [address]

## 2018-06-12 NOTE — DIAGNOSTIC IMAGING REPORT
PROCEDURE: CT ABDOMEN AND PELVIS WITHOUT CONTRAST

 

TECHNIQUE: 

The abdomen and pelvis were scanned utilizing a multidetector helical 

scanner from the diaphragm to the lesser trochanter without IV or oral 

contrast material per renal stone protocol Coronal and sagittal 

multiplanar reformations were obtained.

 

DLP:  228.81

 

COMPARISON:   [facility], DX, PELVIS AP 1-2 VIEWS, 

5/01/2018, 15:11.

 

INDICATIONS:   LEFT FLANK PAIN

 

FINDINGS:

 

ABSENCE OF INTRAVENOUS CONTRAST DECREASES SENSITIVITY FOR DETECTION OF 

FOCAL LESIONS AND VASCULAR PATHOLOGY.

 

LOWER THORAX: Normal.

 

HEPATOBILIARY: No focal hepatic lesions.  No biliary ductal dilatation. 

Calcified granuloma within the liver. Gallbladder is absent.

SPLEEN: No splenomegaly.

PANCREAS: No focal masses or ductal dilatation.

 

ADRENALS: No adrenal nodules.

KIDNEYS/URETERS: No hydronephrosis, stones, or solid mass lesions.

PELVIC ORGANS/BLADDER: Unremarkable.

 

PERITONEUM / RETROPERITONEUM: No free air or fluid.

LYMPH NODES: No lymphadenopathy.

VESSELS: Vascular calcification. There is a 6 mm splenic artery 

calcification compatible with a small calcified aneurysm.

 

GI TRACT: No distention or wall thickening.

 

BONES AND SOFT TISSUES: Chronic changes about the left hip with 

multiple bone graft chips/beads and a sclerotic femoral head. Patient 

has had a hip prosthesis removed due to infection. Degenerative changes 

of the spine. Old fracture deformity of the right superior pubic rami.

 

IMPRESSION:

 

1. No evidence of renal/ureteral or bladder lithiasis.

2. No evidence of hydronephrosis.

3. Chronic changes about the left hip secondary to removal of a hip 

arthroplasty. 

 

 

Romain Venegas D.O.  

Dictated by:  Romain Venegas D.O. on 6/12/2018 at 13:34     

Electronically approved by:  Romain Venegas D.O. on 6/12/2018 at 13:34

## 2018-06-12 NOTE — XMS REPORT
Continuity of Care Document

 Created on: 2018



LISBETH PRATER

External Reference #: [id number]

: 1929

Sex: Female



Demographics







 Address  [address] [place]  [address]

 

 Home Phone  [phone]

 

 Preferred Language  Unknown

 

 Marital Status  Unknown

 

 Yarsani Affiliation  Unknown

 

 Race  

 

 Ethnic Group  Unknown





Author







 Author  [facility]

 

 Organization  [facility]

 

 Address  [address] [place]  [address]



 

 Phone  Unavailable







Support







 Name  Relationship  Address  Phone

 

 ASHLEY KIMBALL MD  Caregiver  [address]

[place]  [address]  Unavailable

 

 JACY MARTINS MD  Caregiver  [address] [place]  [address]  [phone]

 

 JACY MARTINS MD  Caregiver  [address] [place]  [address]  [phone]

 

 CASANDRA AHUMADA MD  Caregiver  [address]

[place]  [address]  [phone]

 

 CASANDRA AHUMADA MD  Caregiver  [address]

[place]  [address]  [phone]

 

 STEVEN SALAS  Next Of Kin  [address], [place]  [address] [phone]







Care Team Providers







 Care Team Member Name  Role  Phone

 

 JAYC MARTINS MD  PCP  [phone]







Advance Directives







 Directive  Response  Recorded Date/Time

 

 Does the patient have an advance directive?  Yes  18 5:50pm

 

 If yes, is advance directive on file with [facility]?  No  18 9:00pm

 

 If not on file with [facility] will patient provide a copy?  No  18 9:00pm

 

 Do you have a Directive to Physician?  No  18 9:00pm

 

 Do you have a Medical Power of ?  No  18 9:00pm

 

 Do you have an out of hospital Do Not Resuscitate Order?  No  18 9:01pm

 

 Do you have any special needs we should be aware of?  No  18 9:01pm

 

 Do you have a support person here with you today?  Yes  18 9:01pm

 

 Did patient receive Notice of Privacy Practices?  Yes  18 9:01pm

 

 Did patient receive patient rights and responsibilities?  Yes  18 9:01pm







Problems







 Medical Problem  Onset Date  Status

 

 Infection of left prosthetic hip joint  Unknown   







Medications





Current Home Medications





 Medication  Dose  Units  Route  Directions  Days  Qty  Instructions  Start Date

 

 Alendronate Sodium 70 Mg Tablet        Oral  Use As Directed            

 

 Aspirin (Aspir 81) 81 Mg Tablet.dr        Oral  Daily            

 

 Clopidogrel Bisulfate (Clopidogrel) 75 Mg Tablet  75  Mg  Oral  Daily     30 
Tab      

 

 Losartan Potassium 100 Mg Tablet  60  Mg  Oral  Daily            

 

 Oxybutynin Chloride 5 Mg Tablet  5  Mg  Oral  Every 6 Hours     30 Tab      







Social History







 Social History Problem  Response  Recorded Date/Time  Onset Date  Status

 

 Hx Psychiatric Problems  No  2018 5:50pm  Not Applicable  Not Applicable

 

 Hx Eating Disorder  No  2018 5:50pm  Not Applicable  Not Applicable

 

 Hx Substance Use Disorder  No  2018 5:50pm  Not Applicable  Not 
Applicable

 

 Hx Depression  No  2018 5:50pm  Not Applicable  Not Applicable

 

 Hx Alcohol Use  No  2018 5:50pm  Not Applicable  Not Applicable

 

 Hx Substance Use Treatment  No  2018 5:50pm  Not Applicable  Not 
Applicable

 

 Hx Physical Abuse  No  2018 5:50pm  Not Applicable  Not Applicable











 Smoking Status  Start Date  Stop Date

 

 Never Smoker      







Hospital Discharge Instructions

No hospital discharge instruction information available.



Plan of Care







 Discharge Date  18 2:19pm

 

 Disposition  LONG TERM ACUTE CARE (LTAC)

 

 Prescriptions  See Medication Section







Functional Status







 Query  Response  Date Recorded

 

 FUNCTIONAL STATUS  .

  May 2, 2018 2:43pm

 

 Assistive Devices  Rolling Walker

  May 1, 2018 6:03pm

 

 Ambulation Ability  Minimum Assistance

  May 1, 2018 6:03pm

 

 Toileting Ability  Moderate Assistance

  May 6, 2018 6:02pm







Allergies, Adverse Reactions, Alerts







 Allergen  Type  Severity  Reaction  Status  Last Updated

 

 ANTIBACTERIAL SOAP  Allergy  Unknown     Active  18







Immunizations

No immunization information available.



Vital Signs





Acute Vital Signs





 Vital  Response  Date/Time

 

 Temperature (Fahrenheit)  98.0 degrees F (97.6 - 99.5)  2018 12:27pm

 

 Pulse      

 

    Pulse Rate (adult)  92 bpm (60 - 90)  2018 12:27pm

 

 Respiratory Rate  20 bpm (12 - 24)  2018 12:27pm

 

 Blood Pressure  163/81 mm Hg  2018 12:27pm

 

 Height  4 ft 10 in  2018 4:50pm

 

 Weight  112 lb  2018 12:00am

 

 Body Mass Index  23.4 kg/m^2  2018 12:00am







Results





Laboratory Results





 Test Name  Result  Units  Flags  Reference  Collection Date/Time  Result Date/
Time  Comments

 

 White Blood Count  6.42  x10e3/uL     4.8-10.8  2018 6:30am  2018 7
:35am   

 

 Red Blood Count  2.78  x10e6/uL   L  3.6-5.1  2018 6:30am  2018 7:
35am   

 

 Hemoglobin  8.1  g/dL   L  12.0-16.0  2018 6:302018 7:35am   

 

 Hematocrit  24.3  %   L  34.2-44.1  2018 6:302018 7:35am   

 

 Mean Corpuscular Volume  87.4  fL     81-99  2018 6:302018 7:
35am   

 

 Mean Corpuscular Hemoglobin  29.1  pg     28-32  2018 6:30am  2018 
7:35am   

 

 Mean Corpuscular Hemoglobin Concent  33.3  g/dL     31-35  2018 6:302018 7:35am   

 

 Red Cell Distribution Width  13.6  %     11.7-14.4  2018 6:302018 7:35am   

 

 Platelet Count  132  x10e3/uL   L  140-360  2018 6:302018 7:
35am   

 

 Neutrophils (%) (Auto)  60.7  %     38.7-80.0  2018 6:302018 7:
35am   

 

 Lymphocytes (%) (Auto)  22.3  %     18.0-39.1  2018 6:302018 7:
35am   

 

 Monocytes (%) (Auto)  6.9   %     4.4-11.3  2018 6:302018 7:
35am   

 

 Eosinophils (%) (Auto)  9.0  %   H  0.0-6.0  2018 6:302018 7:
35am   

 

 Basophils (%) (Auto)  0.3  %     0.0-1.0  2018 6:30am  2018 7:35am
   

 

 IM GRANULOCYTES %  0.8  %     0.0-1.0  2018 6:30am  2018 7:35am   

 

 Neutrophils # (Auto)  3.9        2.1-6.9  2018 6:30am  2018 7:35am
   

 

 Lymphocytes # (Auto)  1.4        1.0-3.2  2018 6:30am  2018 7:35am
   

 

 Monocytes # (Auto)  0.4        0.2-0.8  2018 6:30am  2018 7:35am   

 

 Eosinophils # (Auto)  0.6      H  0.0-0.4  2018 6:30am  2018 7:
35am   

 

 Basophils # (Auto)  0.0        0.0-0.1  2018 6:30am  2018 7:35am   

 

 Absolute Immature Granulocyte (auto  0.05  x10e3/uL     0-0.1  2018 6:
30am  2018 7:35am   

 

 Prothrombin Time  13.9  seconds     11.9-14.5  2018 5:50pm  2018 6:
17pm   

 

 Prothromb Time International Ratio  1.16           2018 5:50pm  2018 6:17pm  Oral Anticoagulant Therapy INR Values:



 1. Low Intensity Therapy        1.5 - 2.0



 2. Moderate Intensity Therapy   2.0 - 3.0



 3. High Intensity Therapy(1)    2.5 - 3.5



 4. High Intensity Therapy(2)    3.0 - 4.0



 5. Panic Value INR              > 5.0

 

 Activated Partial Thromboplast Time  41.4  seconds   H  23.8-35.5  2018 5
:50pm  2018 6:17pm   

 

 Urine Color  YELLOW        YELLOW  2018 10:50pm  2018 11:07pm   

 

 Urine Clarity  CLEAR        CLEAR  2018 10:50pm  2018 11:07pm   

 

 Urine Specific Gravity  1.015        1.010-1.025  2018 10:50pm  2018 11:07pm   

 

 Urine pH  6        5 - 7  2018 10:50pm  2018 11:07pm   

 

 Urine Leukocyte Esterase  NEGATIVE        NEGATIVE  2018 10:50pm  2018 11:07pm   

 

 Urine Nitrite  NEGATIVE        NEGATIVE  2018 10:50pm  2018 11:
07pm   

 

 Urine Protein  NEGATIVE        NEGATIVE  2018 10:50pm  2018 11:
07pm   

 

 Urine Glucose (UA)  NEGATIVE        NEGATIVE  2018 10:50pm  2018 11
:07pm   

 

 Urine Ketones  NEGATIVE        NEGATIVE  2018 10:50pm  2018 11:
07pm   

 

 Urine Urobilinogen  0.2  mg/dL     0.2 - 1  2018 10:50pm  2018 11:
07pm   

 

 Urine Bilirubin  NEGATIVE        NEGATIVE  2018 10:50pm  2018 11:
07pm   

 

 Urine Blood  NEGATIVE        NEGATIVE  2018 10:50pm  2018 11:07pm 
  

 

 Urine WBC  11-20  /HPF   H  0-5  2018 10:50pm  2018 11:23pm   

 

 Urine RBC  NONE  /HPF     0-5  2018 10:50pm  2018 11:23pm   

 

 Urine Bacteria  RARE  /HPF     NONE  2018 10:50pm  2018 11:23pm   

 

 Urine Epithelial Cells  RARE  /LPF     NONE  2018 10:50pm  2018 11:
23pm   

 

 Sodium Level  141  mmol/L     136-145  2018 5:45am  2018 6:48am   

 

 Potassium Level  3.5  mmol/L     3.5-5.1  2018 5:45am  2018 6:48am
   

 

 Chloride Level  114  mmol/L   H    2018 5:45am  2018 6:48am 
  

 

 Carbon Dioxide Level  24  mmol/L      5:45am  2018 6:
48am   

 

 Anion Gap  6.5  mmol/L   L  8-16  2018 5:45am  2018 6:48am   

 

 Blood Urea Nitrogen  12  mg/dL     7-2018 5:45am  2018 6:48am 
  

 

 Creatinine  0.73  mg/dL     0.57-1.11  2018 5:45am  2018 6:48am   

 

 BUN/Creatinine Ratio  16        6-25  2018 5:452018 6:48am   

 

 Estimat Glomerular Filtration Rate  > 60  ML/MIN     60-  2018 5:45am   6:48am  Ranges were taken from the National Kidney Disease Education 

Program and the National Kid[employer] Foundation literature.







Reference ranges:



 60 or greater: Normal



 16-59 (for 3 consecutive months): Chronic kidney disease 



 15 or less: Kidney failure

 

 Glucose Level  96  mg/dL       2018 5:45am  2018 6:48am   

 

 Calcium Level  7.8  mg/dL   L  8.4-10.2  2018 5:452018 6:48am 
  

 

 Bedside Glucose  130  mg/dL   H    2018 11:54pm  2018 12:
02am  Meter ID: [id number]



 

 Magnesium Level  1.6  MG/DL     1.3-2.1  2018 5:452018 6:50am 
  

 

 Total Bilirubin  0.4  mg/dL     0.2-1.2  2018 5:45am  2018 6:48am 
  

 

 Aspartate Amino Transf (AST/SGOT)  12  IU/L     5-34  2018 5:452018 6:48am   

 

 Alanine Aminotransferase (ALT/SGPT)  7  IU/L     0-55  2018 5:45 6:48am   

 

 Total Protein  5.4  g/dL   L  6.5-8.1  2018 5:452018 6:48am   

 

 Albumin  1.8  g/dL   L  3.5-5.0  2018 5:452018 6:48am   

 

 Globulin  3.6  g/dL   H  2.3-3.5  2018 5:45am  2018 6:48am   

 

 Albumin/Globulin Ratio  0.5      L  0.8-2.0  2018 5:452018 6:
48am   

 

 Alkaline Phosphatase  44  IU/L       2018 5:45am  2018 6:
48am   

 

 Creatine Kinase  47  IU/L       2018 5:50pm  2018 6:26pm   

 

 Creatine Kinase MB  1.20  ng/mL     0-5.0  2018 5:50pm  2018 6:
37pm   

 

 Troponin I  < 0.001  ng/mL     0-0.300  2018 5:50pm  2018 6:37pm   

 

 Vancomycin Level Trough  14.4  ug/mL  *H  5.0-10.0  2018 3:50pm  2018 4:20pm  Results called to ALINA BE RN at 1619 on 18 by 

DA CHESTER. RB OK.









Microbiology Results





 Procedure  Source  Organism/Result  Collection Date/Time  Result Date/Time  
Result Status

 

 Urine Culture  Urine,Random   ENTEROCOCCUS FAECALIS  2018 10:50pm  2018 1:11pm  Final

 

 Blood Culture  Blood   NO GROWTH AFTER 5 DAYS, FINAL REPORT  2018 5:50pm
  2018 6:05pm  Final

 

 Wound Culture  Hip, Left  STAPHYLOCOCCUS AUREUS-MRSA  2018 2:30pm  2018 8:19am  Final







Procedures







 Procedure  Status  Date  Provider(s)

 

 Internal fixation of left hip  Completed  18  CASANDRA AHUMADA MD







Encounters







 Encounter  Location  Arrival/Admit Date  Discharge/Depart Date  Attending 
Provider

 

 Discharged Inpatient  [facility]  18 9:10pm  18 
2:19pm  CASANDRA AHUMADA MD